# Patient Record
(demographics unavailable — no encounter records)

---

## 2021-07-26 NOTE — US
Limited abdominal ultrasound: Multiple real-time images of the upper 

right abdomen were obtained.

 

Comparison: No prior abdominal imaging is available.

 

Liver shows no focal abnormality.  Gallbladder contains no shadowing 

gallstones, gallbladder wall thickening or biliary duct dilatation.  

Pancreas is obscured due to bowel gas.  Right kidney is not 

visualized.  Main portal vein shows normal hepatopedal flow.

 

Impression:

1.  Midline structures are nonvisualized due to bowel gas.

2.  Other portions of the right upper quadrant abdominal ultrasound 

are unremarkable.

 

Diagnostic code #2

## 2021-07-26 NOTE — US
Limited obstetrical ultrasound: Multiple real-time images were 

obtained transabdominally.

 

Comparison: Prior obstetrical imaging at 07/15/21.

 

Dates:

Working KEVIN: 11/19/21, gestational age 22 weeks 3 days

Current ultrasound: KEVIN 12/03/21, gestational age 21 weeks 3 days

 

Fetal presentation: Transverse

Placenta: Anterior with slight placenta previa with inferior edge 

being within 2.2 cm from the internal cervical os

Amniotic fluid: AWAIS 10.7 cm

 

Measurements:

BPD: 4.77 cm - 20 weeks 3 days

Head circumference: 18.57 cm - 20 weeks 6 days

Abdominal circumference: 16.96 cm - 22 weeks 0 days

Femur length: 3.92 cm - 22 weeks 4 days

Estimated fetal weight: 470 g (1 lb. 1 oz.), estimated fetal weight is

 in the 45th percentile

Heart rate: 159 bpm

 

Growth curves: Various growth parameters are between the -2 and mean 

standard deviation lines.  Growth is appropriate from prior exams.

 

Impression:

1.  Single intrauterine fetus currently transverse in position.  Dates

 as noted above.

2.  Slight low lying placenta.

3.  Fetal growth is appropriate.  No acute abnormality is identified.

 

Diagnostic code #2

## 2021-07-27 NOTE — PCM.PN
- General Info


Date of Service: 07/27/21


Functional Status: Reports: Pain Controlled





- Review of Systems


General: Reports: No Symptoms


HEENT: Reports: No Symptoms


Pulmonary: Reports: No Symptoms


Cardiovascular: Reports: No Symptoms


Gastrointestinal: Reports: No Symptoms


Genitourinary: Reports: No Symptoms


Musculoskeletal: Reports: No Symptoms


Skin: Reports: No Symptoms


Neurological: Reports: No Symptoms


Psychiatric: Reports: No Symptoms





- Patient Data


Vitals - Most Recent: 


                                Last Vital Signs











Temp  37.1 C   07/27/21 06:20


 


Pulse  71   07/27/21 06:20


 


Resp  14   07/27/21 06:20


 


BP  111/79   07/27/21 06:20


 


Pulse Ox  96   07/27/21 06:20











Weight - Most Recent: 57.606 kg


I&O - Last 24 Hours: 


                                 Intake & Output











 07/26/21 07/27/21 07/27/21





 22:59 06:59 14:59


 


Intake Total 50  


 


Output Total 200  


 


Balance -150  











Med Orders - Current: 


                               Current Medications





Hydroxyzine HCl (Hydroxyzine Hcl 25 Mg/Ml Sdv)  50 mg IM Q6H PRN


   PRN Reason: Nausea


   Last Admin: 07/26/21 07:51 Dose:  50 mg


   Documented by: 


Dextrose/Lactated Ringer's (Dextrose 5%-Lactated Ringers)  1,000 mls @ 150 

mls/hr IV ASDIRECTED [facility]


   Last Admin: 07/26/21 21:26 Dose:  150 mls/hr


   Documented by: 


Promethazine HCl 25 mg/ Sodium (Chloride)  51 mls @ 100 mls/hr IV Q4H PRN


   PRN Reason: Nausea


   Last Admin: 07/27/21 08:57 Dose:  100 mls/hr


   Documented by: 


Ondansetron HCl (Ondansetron 4 Mg/2 Ml Sdv)  4 mg IVPUSH Q4H PRN


   PRN Reason: Nausea


   Last Admin: 07/26/21 04:33 Dose:  4 mg


   Documented by: 


Promethazine HCl (Promethazine 25 Mg Tab)  25 mg PO Q4H PRN


   PRN Reason: Nausea/Vomiting


   Last Admin: 07/27/21 06:55 Dose:  25 mg


   Documented by: 





Discontinued Medications





Al Hydroxide/Mg Hydroxide 30 (ml/ Lidocaine HCl 15 ml)  0 ml PO ONETIME ONE


   Stop: 07/26/21 05:44


   Last Admin: 07/26/21 05:58 Dose:  45 ml


   Documented by: 


Sodium Chloride (Normal Saline)  1,000 mls @ 999 mls/hr IV ONETIME ONE


   Stop: 07/26/21 05:16


   Last Admin: 07/26/21 04:28 Dose:  999 mls/hr


   Documented by: 


Promethazine HCl 25 mg/ Sodium (Chloride)  51 mls @ 100 mls/hr IV ONETIME ONE


   Stop: 07/26/21 11:15


   Last Admin: 07/26/21 10:46 Dose:  100 mls/hr


   Documented by: 











- Exam


General: Alert, Oriented


HEENT: Pupils Equal, Pupils Reactive, EOMI, Mucous Membr. Moist/Pink


Neck: Supple


Lungs: Clear to Auscultation, Normal Respiratory Effort


Cardiovascular: Regular Rate, Regular Rhythm


GI/Abdominal Exam: Normal Bowel Sounds, Soft, Non-Tender, No Organomegaly, No 

Distention, No Abnormal Bruit, No Mass, Pelvis Stable


 (Female) Exam: Normal External Exam


Extremities: Normal Inspection, Normal Range of Motion, Non-Tender, No Pedal 

Edema, Normal Capillary Refill


Skin: Warm, Dry, Intact


Wound/Incisions: Healing Well


Neurological: No New Focal Deficit





- Patient Data


Result Diagrams: 


                                 07/26/21 04:25





                                 07/26/21 04:25





Sepsis Event Note





- Evaluation


Sepsis Screening Result: No Definite Risk





- Focused Exam


Vital Signs: 


                                   Vital Signs











  Temp Pulse Resp BP Pulse Ox


 


 07/27/21 06:20  37.1 C  71  14  111/79  96


 


 07/27/21 01:48  37.2 C  71  14  129/80  93 L














- Problem List Review


Problem List Initiated/Reviewed/Updated: Yes





- My Orders


Last 24 Hours: 


My Active Orders





07/27/21 09:00


CORONAVIRUS COVID-19 LUL [MOLEC] Stat 














- Assessment


Assessment:: 





27 year old G1 here with nausea and intractable vomiting. Had been doing better 

over night. Now tried apple sauce and ice chips and had significant emesis. 





- Plan


Plan:: 





Nausea and emesis. continue IV phenergan. Labs pending. 


Can try rectal phenergan if keeps down food and fluids with IV.

## 2021-07-28 NOTE — CR
Abdomen: Supine and upright views of the abdomen were obtained.

 

Comparison: No prior abdominal x-rays available.

 

Diffuse gaseous dilatation is seen within the colon.  Lack of gas is 

noted within the descending colon and rectum.  Air-fluid levels are 

noted within the colon.

 

Soft tissue fullness is seen within the left side of the pelvis and 

extending slightly superior.

 

No free air is seen.  Bony structures are unremarkable.

 

Impression:

1.  Significantly gas dilated colon with air-fluid levels.  Lack of 

gas within the descending colon and rectum are seen.  Soft tissue 

prominence is seen within the left side of the pelvis extending 

superiorly.  Please correlate if patient has any symptoms to suggest 

ovarian torsion or other obstructing soft tissue abnormality.

2.  No free air is seen at this time.

 

Diagnostic code #5

## 2021-07-28 NOTE — PCM.PRNOTE
- Free Text/Narrative


Note: 





Date: 7/28/2021


Operation: laparotomy, left colectomy with end colostomy


Surgeon: John Blanco MD


Assisting: Jonnie Watts MD


Indication: colonic obstruction





DVT Ppx: SCD


Antibiotic: Ancef 2 g IV pre-op


EBL: 100 cc





Findings: 28 yo woman, 22 weeks pregnant, with evidence of colonic obstruction. 

Very distended but viable colon with near-completely obstructing circumferential

tumor near the junction of the descending and sigmoid colon. No mesenteric 

lymphadenopathy or other evidence of intra-abdominal metastasis. Distal 

transverse colon brought out as end colostomy. 





Detailed Report:





The patient was taken to the operating room and placed supine on the table. Time

out was performed and general endotracheal anesthesia initiated. The abdomen was

prepped and draped in usual sterile fashion after placement of a trinidad catheter.

10 cc 0.5% marcaine with epinpehrine was injected along planned midline laparot

estelita incision. A 20 cm midline laparotomy was performed using the scalpel. 

Monopolar energy was used to dissect through subcutaneous fat to the linea alba.

Fascia was grasped on either side of midline and elevated. The fascia and 

peritoneum were divided sharply and the abdominal cavity was entered safely. The

incision was then completely opened and abdominal contents inspected. There was 

some serosanguinous ascites and massive dilation of the cecum, with less 

pronounced dilation of the transverse colon. The cecum was brought toward 

midline. On handling, there were two sizeable serosal tears but no evidence of 

perforation or bowel ischemia. A small colotomy was made to decompress the bowel

of some air. Afterwards, the colotomy was closed in layers with transverse 

interrupted vicryl sutures. The colon was inspected carefully. There was no 

evidence of volvulus or significant small bowel dilation. There were no 

intraabdominal adhesions. On palpation of the descending colon near the start of

the sigmoid colon was a palpable stricture with surrounding tissue induration 

that was initially thought to just be palpable intraluminal stool but on closer 

inspection felt markedly abnormal and seemed to be the point of obstruction. An 

oncologic resection was performed. Colon was divided with a linear stapler about

10 cm distal to the mass. The descending colon was reflected medially and the la

teral attachments divided with monopolar energy along the white line of Toldt. 

As the colon was reflected medially, blunt dissection with a sponge stick was 

used to separate meso colon from the underlying retroperitoneum .The splenic 

flexure and distal tranverse colon were brought toward midline. The IMV was 

identified from the medial aspect and divided near its root with the Ligasure 

Impact. The colon was divided proximally about 20 cm from the mass at the distal

transverse colon. Remaining mesentery was divided with the Ligasure. The 

specimen was opened on the back table and there was a circumferential near 

obstructing tumor. The specimen was sent for analysis. Next, cecal serosal tears

were repaired with running 3-0 vicryl suture. There were two tears, each about 7

cm in length. Next, the left abdomen was prepared for colostomy creation. A 

circular incision was made through skin overlying the rectus muscle, just 

superior to the umbilicus. Dissection was taken down to the anterior rectus 

sheath. This was incised in cruciate fashion. muscle fibers were splayed to 

expose the posterior sheath. this was incised and dilated manually to three 

fingerbreadths. the end of the tranverse colon was brought up through the 

abdominal wall. Next, the midline was closed. Running 0 PDS suture was used to 

close fascia. Skin was closed with staples. A dressing was applied. Next, the 

colostomy was matured. The staple line was removed and colonic contents 

suctioned. Digital exam confirmed patency through the abdominal wall. Several 

interrupted 3-0 vicryl sutures were used to secure full thickness bites of colon

the the surrounding skin circumferentially. The mesocolon lay at the medial 

aspect. An ostomy appliance was placed. Additional local anesthetic was placed 

around the ostomy site intradermally. The midline wound was then redressed. The 

patient tolerated the procedure well.

## 2021-07-28 NOTE — PCM.PN
- General Info


Date of Service: 07/28/21


Functional Status: Reports: New Symptoms (significant bloating and distention, 

reports no BM since friday and no gas since prior to that).  Denies: Tolerating 

Diet





- Review of Systems


General: Reports: No Symptoms


HEENT: Reports: No Symptoms


Pulmonary: Reports: No Symptoms


Cardiovascular: Reports: No Symptoms


Gastrointestinal: Reports: No Symptoms


Genitourinary: Reports: No Symptoms


Musculoskeletal: Reports: No Symptoms


Skin: Reports: No Symptoms


Neurological: Reports: No Symptoms


Psychiatric: Reports: No Symptoms





- Patient Data


Vitals - Most Recent: 


                                Last Vital Signs











Temp  37.1 C   07/28/21 00:18


 


Pulse  82   07/28/21 00:18


 


Resp  14   07/28/21 00:18


 


BP  137/82   07/28/21 00:18


 


Pulse Ox  96   07/28/21 00:18











Weight - Most Recent: 57.606 kg


I&O - Last 24 Hours: 


                                 Intake & Output











 07/27/21 07/27/21 07/28/21





 14:59 22:59 06:59


 


Intake Total 120 800 


 


Output Total   100


 


Balance 120 800 -100











Lab Results Last 24 Hours: 


                         Laboratory Results - last 24 hr











  07/27/21 07/27/21 07/27/21 Range/Units





  09:16 09:55 09:55 


 


WBC   8.87   (3.98-10.04)  K/mm3


 


RBC   4.09   (3.98-5.22)  M/mm3


 


Hgb   11.6   (11.2-15.7)  gm/dl


 


Hct   34.8   (34.1-44.9)  %


 


MCV   85.1   (79.4-94.8)  fl


 


MCH   28.4   (25.6-32.2)  pg


 


MCHC   33.3   (32.2-35.5)  g/dl


 


RDW Std Deviation   47.3 H   (36.4-46.3)  fL


 


Plt Count   218   (182-369)  K/mm3


 


MPV   10.3   (9.4-12.3)  fl


 


Neut % (Auto)   88.6 H   (34.0-71.1)  %


 


Lymph % (Auto)   4.8 L   (19.3-51.7)  %


 


Mono % (Auto)   6.3   (4.7-12.5)  %


 


Eos % (Auto)   0.1 L   (0.7-5.8)  


 


Baso % (Auto)   0.0 L   (0.1-1.2)  %


 


Neut # (Auto)   7.85 H   (1.56-6.13)  K/mm3


 


Lymph # (Auto)   0.43 L   (1.18-3.74)  K/mm3


 


Mono # (Auto)   0.56 H   (0.24-0.36)  K/mm3


 


Eos # (Auto)   0.01 L   (0.04-0.36)  K/mm3


 


Baso # (Auto)   0.00 L   (0.01-0.08)  K/mm3


 


Sodium    143  (136-145)  mEq/L


 


Potassium    2.9 L  (3.5-5.1)  mEq/L


 


Chloride    108 H  ()  mEq/L


 


Carbon Dioxide    25  (21-32)  mEq/L


 


Anion Gap    12.9  (5-15)  


 


BUN    6 L  (7-18)  mg/dL


 


Creatinine    0.7  (0.55-1.02)  mg/dL


 


Est Cr Clr Drug Dosing    99.86  mL/min


 


Estimated GFR (MDRD)    > 60  (>60)  mL/min


 


BUN/Creatinine Ratio    8.6 L  (14-18)  


 


Glucose    115 H  (70-99)  mg/dL


 


Calcium    8.0 L  (8.5-10.1)  mg/dL


 


SARS-CoV-2 RNA (LUL)  Negative    (NEGATIVE)  











Med Orders - Current: 


                               Current Medications





Acetaminophen (Acetaminophen 650 Mg Supp)  650 mg RECTAL Q6H PRN


   PRN Reason: Abdominal Pain


Glycerin (Glycerin Adult 2 Gm Supp)  1 supp RECTAL ONETIME PRN


   PRN Reason: Constipation


Hydroxyzine HCl (Hydroxyzine Hcl 25 Mg/Ml Sdv)  50 mg IM Q6H PRN


   PRN Reason: Nausea


   Last Admin: 07/26/21 07:51 Dose:  50 mg


   Documented by: 


Dextrose/Lactated Ringer's (Dextrose 5%-Lactated Ringers)  1,000 mls @ 150 

mls/hr IV ASDIRECTED [facility]


   Last Admin: 07/27/21 16:44 Dose:  150 mls/hr


   Documented by: 


Promethazine HCl 25 mg/ Sodium (Chloride)  51 mls @ 100 mls/hr IV Q4H PRN


   PRN Reason: Nausea


   Last Admin: 07/27/21 08:57 Dose:  100 mls/hr


   Documented by: 


Potassium Chloride 40 meq/ (Dextrose/Lactated Ringer's)  1,020 mls @ 150 mls/hr 

IV Q7H [facility]


   Stop: 07/28/21 23:02


   Last Admin: 07/27/21 19:39 Dose:  150 mls/hr


   Documented by: 


Levothyroxine Sodium (Levothyroxine 50 Mcg Tab)  50 mcg PO ACBREAKFAST [facility]


Ondansetron HCl (Ondansetron 4 Mg/2 Ml Sdv)  4 mg IVPUSH Q4H PRN


   PRN Reason: Nausea


   Last Admin: 07/26/21 04:33 Dose:  4 mg


   Documented by: 


Promethazine HCl (Promethazine 25 Mg Tab)  25 mg PO Q4H PRN


   PRN Reason: Nausea/Vomiting


   Last Admin: 07/27/21 13:20 Dose:  25 mg


   Documented by: 


Simethicone (Simethicone 80 Mg Tab.Chew)  80 mg PO Q4H PRN


   PRN Reason: Gas


   Last Admin: 07/27/21 22:14 Dose:  80 mg


   Documented by: 





Discontinued Medications





Acetaminophen (Acetaminophen 650 Mg Supp)  975 mg RECTAL NOW ONE


   Stop: 07/27/21 16:13


   Last Admin: 07/27/21 16:48 Dose:  Not Given


   Documented by: 


Acetaminophen (Acetaminophen 650 Mg Supp) Confirm Administered Dose 650 mg 

.ROUTE .STK-MED ONE


   Stop: 07/27/21 17:44


   Last Admin: 07/27/21 18:00 Dose:  650 mg


   Documented by: 


Al Hydroxide/Mg Hydroxide 30 (ml/ Lidocaine HCl 15 ml)  0 ml PO ONETIME ONE


   Stop: 07/26/21 05:44


   Last Admin: 07/26/21 05:58 Dose:  45 ml


   Documented by: 


Glycerin (Glycerin Adult 2 Gm Supp)  1 supp RECTAL ONETIME ONE


   Stop: 07/27/21 20:48


   Last Admin: 07/27/21 21:33 Dose:  Not Given


   Documented by: 


Glycerin (Glycerin Pediatric 1.2 Gm Supp)  3 gm RECTAL ONETIME ONE


   Stop: 07/27/21 21:14


   Last Admin: 07/27/21 21:28 Dose:  3 gm


   Documented by: 


Sodium Chloride (Normal Saline)  1,000 mls @ 999 mls/hr IV ONETIME ONE


   Stop: 07/26/21 05:16


   Last Admin: 07/26/21 04:28 Dose:  999 mls/hr


   Documented by: 


Promethazine HCl 25 mg/ Sodium (Chloride)  51 mls @ 100 mls/hr IV ONETIME ONE


   Stop: 07/26/21 11:15


   Last Admin: 07/26/21 10:46 Dose:  100 mls/hr


   Documented by: 


Potassium Chloride 40 meq/ (Dextrose/Lactated Ringer's)  1,020 mls @ 150 mls/hr 

IV ASDIRECTED [facility]


   Stop: 07/31/21 01:02


Levothyroxine Sodium (Levothyroxine 50 Mcg Tab)  50 mcg PO ONETIME ONE


   Stop: 07/27/21 10:46


   Last Admin: 07/27/21 13:11 Dose:  50 mcg


   Documented by: 











- Exam


General: Alert, Oriented


HEENT: Pupils Equal, Pupils Reactive, EOMI, Mucous Membr. Moist/Pink


Neck: Supple


Lungs: Clear to Auscultation, Normal Respiratory Effort


Cardiovascular: Regular Rate, Regular Rhythm


GI/Abdominal Exam: Normal Bowel Sounds, Soft, Non-Tender, No Organomegaly, No 

Distention, No Abnormal Bruit, No Mass, Pelvis Stable


 (Female) Exam: Normal External Exam, Normal Speculum Exam, Normal Bimanual 

Exam


Back Exam: Normal Inspection, Full Range of Motion


Extremities: Normal Inspection, Normal Range of Motion, Non-Tender, No Pedal 

Edema, Normal Capillary Refill


Skin: Warm, Dry, Intact


Wound/Incisions: Healing Well


Neurological: No New Focal Deficit


Psy/Mental Status: Alert, Normal Affect, Normal Mood





- Patient Data


Lab Results Last 24 hrs: 


                         Laboratory Results - last 24 hr











  07/27/21 07/27/21 07/27/21 Range/Units





  09:16 09:55 09:55 


 


WBC   8.87   (3.98-10.04)  K/mm3


 


RBC   4.09   (3.98-5.22)  M/mm3


 


Hgb   11.6   (11.2-15.7)  gm/dl


 


Hct   34.8   (34.1-44.9)  %


 


MCV   85.1   (79.4-94.8)  fl


 


MCH   28.4   (25.6-32.2)  pg


 


MCHC   33.3   (32.2-35.5)  g/dl


 


RDW Std Deviation   47.3 H   (36.4-46.3)  fL


 


Plt Count   218   (182-369)  K/mm3


 


MPV   10.3   (9.4-12.3)  fl


 


Neut % (Auto)   88.6 H   (34.0-71.1)  %


 


Lymph % (Auto)   4.8 L   (19.3-51.7)  %


 


Mono % (Auto)   6.3   (4.7-12.5)  %


 


Eos % (Auto)   0.1 L   (0.7-5.8)  


 


Baso % (Auto)   0.0 L   (0.1-1.2)  %


 


Neut # (Auto)   7.85 H   (1.56-6.13)  K/mm3


 


Lymph # (Auto)   0.43 L   (1.18-3.74)  K/mm3


 


Mono # (Auto)   0.56 H   (0.24-0.36)  K/mm3


 


Eos # (Auto)   0.01 L   (0.04-0.36)  K/mm3


 


Baso # (Auto)   0.00 L   (0.01-0.08)  K/mm3


 


Sodium    143  (136-145)  mEq/L


 


Potassium    2.9 L  (3.5-5.1)  mEq/L


 


Chloride    108 H  ()  mEq/L


 


Carbon Dioxide    25  (21-32)  mEq/L


 


Anion Gap    12.9  (5-15)  


 


BUN    6 L  (7-18)  mg/dL


 


Creatinine    0.7  (0.55-1.02)  mg/dL


 


Est Cr Clr Drug Dosing    99.86  mL/min


 


Estimated GFR (MDRD)    > 60  (>60)  mL/min


 


BUN/Creatinine Ratio    8.6 L  (14-18)  


 


Glucose    115 H  (70-99)  mg/dL


 


Calcium    8.0 L  (8.5-10.1)  mg/dL


 


SARS-CoV-2 RNA (LUL)  Negative    (NEGATIVE)  











Result Diagrams: 


                                 07/27/21 09:55





                                 07/27/21 09:55





Sepsis Event Note





- Evaluation


Sepsis Screening Result: No Definite Risk





- Focused Exam


Vital Signs: 


                                   Vital Signs











  Temp Temp Pulse Resp BP Pulse Ox


 


 07/28/21 00:18  37.1 C   82  14  137/82  96


 


 07/27/21 19:33  37.8 C   73  14  120/79  96


 


 07/27/21 18:30  37.8 C     


 


 07/27/21 17:30  37.3 C   77  16  132/94 H  97


 


 07/27/21 16:49   37.6 C    


 


 07/27/21 16:48  37.6 C     


 


 07/27/21 15:52  38.1 C   90  18  141/97 H  100














- Problem List Review


Problem List Initiated/Reviewed/Updated: Yes





- My Orders


Last 24 Hours: 


My Active Orders





07/27/21 16:56


Admission Status [Patient Status] [ADT] Routine 





07/27/21 17:53


Acetaminophen [Tylenol]   650 mg RECTAL Q6H PRN 





07/27/21 19:15


Potassium Chloride 40 meq   Dextrose 5%-Lactated Ringers 1,000 ml IV Q7H 





07/27/21 20:48


Simethicone   80 mg PO Q4H PRN 





07/27/21 22:06


Glycerin [Adult Glycerin]   1 supp RECTAL ONETIME PRN 





07/28/21 05:00


COMPREHENSIVE METABOLIC PN,CMP [CHEM] Routine 





07/28/21 06:00


Levothyroxine [Synthroid]   50 mcg PO ACBREAKFAST 














- Assessment


Assessment:: 





27 year old G1 here with nausea and intractable vomiting. Had been doing better 

through evening but now feeling significant bloating and discomfort. Reports 

last BM Friday (5-6 days ago) and no gas from before that. 





Trial of mineral enema and if no success/improvement discussed general surgery 

consult and either flat plat abdominal film or CT abdomen depending on surgical 

assessment.

## 2021-07-28 NOTE — PCM.CONS
H&P History of Present Illness





- General


Date of Service: 21


Admit Problem/Dx: 


                           Admission Diagnosis/Problem





Admission Diagnosis/Problem      Pregnancy








Source of Information: Patient


History Limitations: Reports: No Limitations





- History of Present Illness


Initial Comments - Free Text/Narative: 





Ms. Corea is a 28 yo woman who is 22 weeks pregnant () who was admitted 48 

hours ago due to abdominal bloating, pain, nausea and vomiting. She was feeling 

well with no pregnancy issues up to this point. She has hypothyroidism for which

she takes synthroid but is otherwise healthy appearing and has had no prior 

abdominal operations. 


She has been unable to keep anything down for a few days now, vomiting 

frequently. She cannot recall passing flatus for the past few days. She was 

having normal bowel movements up to the time she started feeling ill. She was 

given a suppository last night and reports having a soft bowel movement 

afterwards. 


On exam, she appears mildly uncomfortable. Vitals are in normal range. Abdomen 

is distended, tympanitic, soft, with mild diffuse tenderness. A gestational and 

abdominal ultrasound were obtained with no pathologic findings noted- the 

gallbladder appeared normal with no stones. Her CBC is within normal range 

except for mild, stable anemia around 11.5 g/dL. CMP is significant only for 

hypokalemia around 3 mEq/dL, and U/A shows ketonuria. 





- Related Data


Allergies/Adverse Reactions: 


                                    Allergies











Allergy/AdvReac Type Severity Reaction Status Date / Time


 


No Known Allergies Allergy   Verified 21 03:54











Home Medications: 


                                    Home Meds





Levothyroxine Sodium [Levothyroxine] 50 mcg PO DAILY 21 [History]


Prenatal No122/Iron/Folic Acid [Prenatal Multi Tablet] 1 each PO DAILY 21 

[History]











Past Medical History


HEENT History: Reports: None


Cardiovascular History: Reports: None


Respiratory History: Reports: None


Gastrointestinal History: Reports: None


Genitourinary History: Reports: None


OB/GYN History: Reports: Pregnancy


Musculoskeletal History: Reports: None


Neurological History: Reports: None


Psychiatric History: Reports: None


Endocrine/Metabolic History: Reports: Hypothyroidism


Hematologic History: Reports: None


Immunologic History: Reports: None


Oncologic (Cancer) History: Reports: None


Dermatologic History: Reports: None





- Past Surgical History


Neurological Surgical History: Reports: None


Musculoskeletal Surgical History: Reports: None





Social & Family History





- Tobacco Use


Tobacco Use Status *Q: Never Tobacco User


Second Hand Smoke Exposure: No





- Caffeine Use


Caffeine Use: Reports: Energy Drinks, Soda





- Recreational Drug Use


Recreational Drug Use: No





H&P Review of Systems





- Review of Systems:


Review Of Systems: See Below


General: Reports: Malaise


HEENT: Reports: No Symptoms


Pulmonary: Reports: No Symptoms


Cardiovascular: Reports: No Symptoms


Gastrointestinal: Reports: Abdominal Pain, Nausea, Vomiting


Genitourinary: Reports: No Symptoms


Musculoskeletal: Reports: No Symptoms


Skin: Reports: No Symptoms


Psychiatric: Reports: No Symptoms


Neurological: Reports: No Symptoms


Hematologic/Lymphatic: Reports: No Symptoms


Immunologic: Reports: No Symptoms





Exam





- Exam


Exam: See Below





- Vital Signs


Vital Signs: 


                                Last Vital Signs











Temp  36.8 C   21 03:59


 


Pulse  82   21 03:59


 


Resp  16   21 03:59


 


BP  125/94 H  21 03:59


 


Pulse Ox  97   21 03:59











Weight: 57.606 kg





- Exam


General: Alert, Oriented, Cooperative


HEENT: Conjunctiva Clear


Neck: Supple, Trachea Midline


Lungs: Clear to Auscultation, Normal Respiratory Effort


Cardiovascular: Regular Rate, Regular Rhythm


GI/Abdominal Exam: Normal Bowel Sounds, Soft, Distended, Tender, Other (gravid 

uterus)


 (Female) Exam: Deferred


Rectal (Female) Exam: Deferred


Extremities: Normal Inspection


Skin: Warm, Dry


Neuro Extensive - Mental Status: Alert, Oriented x3, Normal Mood/Affect


Psychiatric: Alert





- Patient Data


Lab Results Last 24 hrs: 


                         Laboratory Results - last 24 hr











  21 Range/Units





  09:16 09:55 09:55 


 


WBC   8.87   (3.98-10.04)  K/mm3


 


RBC   4.09   (3.98-5.22)  M/mm3


 


Hgb   11.6   (11.2-15.7)  gm/dl


 


Hct   34.8   (34.1-44.9)  %


 


MCV   85.1   (79.4-94.8)  fl


 


MCH   28.4   (25.6-32.2)  pg


 


MCHC   33.3   (32.2-35.5)  g/dl


 


RDW Std Deviation   47.3 H   (36.4-46.3)  fL


 


Plt Count   218   (182-369)  K/mm3


 


MPV   10.3   (9.4-12.3)  fl


 


Neut % (Auto)   88.6 H   (34.0-71.1)  %


 


Lymph % (Auto)   4.8 L   (19.3-51.7)  %


 


Mono % (Auto)   6.3   (4.7-12.5)  %


 


Eos % (Auto)   0.1 L   (0.7-5.8)  


 


Baso % (Auto)   0.0 L   (0.1-1.2)  %


 


Neut # (Auto)   7.85 H   (1.56-6.13)  K/mm3


 


Lymph # (Auto)   0.43 L   (1.18-3.74)  K/mm3


 


Mono # (Auto)   0.56 H   (0.24-0.36)  K/mm3


 


Eos # (Auto)   0.01 L   (0.04-0.36)  K/mm3


 


Baso # (Auto)   0.00 L   (0.01-0.08)  K/mm3


 


Manual Slide Review     


 


Sodium    143  (136-145)  mEq/L


 


Potassium    2.9 L  (3.5-5.1)  mEq/L


 


Chloride    108 H  ()  mEq/L


 


Carbon Dioxide    25  (21-32)  mEq/L


 


Anion Gap    12.9  (5-15)  


 


BUN    6 L  (7-18)  mg/dL


 


Creatinine    0.7  (0.55-1.02)  mg/dL


 


Est Cr Clr Drug Dosing    99.86  mL/min


 


Estimated GFR (MDRD)    > 60  (>60)  mL/min


 


BUN/Creatinine Ratio    8.6 L  (14-18)  


 


Glucose    115 H  (70-99)  mg/dL


 


Calcium    8.0 L  (8.5-10.1)  mg/dL


 


Total Bilirubin     (0.2-1.0)  mg/dL


 


AST     (15-37)  U/L


 


ALT     (14-59)  U/L


 


Alkaline Phosphatase     ()  U/L


 


Total Protein     (6.4-8.2)  g/dl


 


Albumin     (3.4-5.0)  g/dl


 


Globulin     gm/dL


 


Albumin/Globulin Ratio     (1-2)  


 


SARS-CoV-2 RNA (LUL)  Negative    (NEGATIVE)  














  21 Range/Units





  06:20 06:20 


 


WBC   8.33  (3.98-10.04)  K/mm3


 


RBC   3.87 L  (3.98-5.22)  M/mm3


 


Hgb   11.1 L  (11.2-15.7)  gm/dl


 


Hct   33.2 L  (34.1-44.9)  %


 


MCV   85.8  (79.4-94.8)  fl


 


MCH   28.7  (25.6-32.2)  pg


 


MCHC   33.4  (32.2-35.5)  g/dl


 


RDW Std Deviation   47.5 H  (36.4-46.3)  fL


 


Plt Count   201  (182-369)  K/mm3


 


MPV   10.0  (9.4-12.3)  fl


 


Neut % (Auto)   89.6 H  (34.0-71.1)  %


 


Lymph % (Auto)   4.4 L  (19.3-51.7)  %


 


Mono % (Auto)   5.5  (4.7-12.5)  %


 


Eos % (Auto)   0 L  (0.7-5.8)  


 


Baso % (Auto)   0.1  (0.1-1.2)  %


 


Neut # (Auto)   7.46 H  (1.56-6.13)  K/mm3


 


Lymph # (Auto)   0.37 L  (1.18-3.74)  K/mm3


 


Mono # (Auto)   0.46 H  (0.24-0.36)  K/mm3


 


Eos # (Auto)   0.00 L  (0.04-0.36)  K/mm3


 


Baso # (Auto)   0.01  (0.01-0.08)  K/mm3


 


Manual Slide Review   Abnormal smear  


 


Sodium  140   (136-145)  mEq/L


 


Potassium  3.1 L   (3.5-5.1)  mEq/L


 


Chloride  106   ()  mEq/L


 


Carbon Dioxide  23   (21-32)  mEq/L


 


Anion Gap  14.1   (5-15)  


 


BUN  5 L   (7-18)  mg/dL


 


Creatinine  0.6   (0.55-1.02)  mg/dL


 


Est Cr Clr Drug Dosing  116.50   mL/min


 


Estimated GFR (MDRD)  > 60   (>60)  mL/min


 


BUN/Creatinine Ratio  8.3 L   (14-18)  


 


Glucose  138 H   (70-99)  mg/dL


 


Calcium  7.9 L   (8.5-10.1)  mg/dL


 


Total Bilirubin  0.3   (0.2-1.0)  mg/dL


 


AST  27   (15-37)  U/L


 


ALT  34   (14-59)  U/L


 


Alkaline Phosphatase  41 L   ()  U/L


 


Total Protein  6.2 L   (6.4-8.2)  g/dl


 


Albumin  2.7 L   (3.4-5.0)  g/dl


 


Globulin  3.5   gm/dL


 


Albumin/Globulin Ratio  0.8 L   (1-2)  


 


SARS-CoV-2 RNA (LUL)    (NEGATIVE)  











Result Diagrams: 


                                 21 06:20





                                 21 06:20





Sepsis Event Note





- Evaluation


Sepsis Screening Result: No Definite Risk





- Focused Exam


Vital Signs: 


                                   Vital Signs











  Temp Pulse Resp BP Pulse Ox


 


 21 03:59  36.8 C  82  16  125/94 H  97


 


 21 00:18  37.1 C  82  14  137/82  96














Consult PN Assessment/Plan


Procedures: 


Procedures





BLOOD TYPING SEROLOGIC ABO (21)


BLOOD TYPING SEROLOGIC RH(D) (21)


CHYLMD TRACH DNA AMP PROBE (21)


COMPLETE CBC W/AUTO DIFF WBC (21)


HEPATITIS B SURFACE AG IA (21)


HEPATITIS C AB TEST (21)


HIV-1 AG W/HIV-1 & -2 AB AG IA (21)


N.GONORRHOEAE DNA AMP PROB (21)


OB US >/= 14 WKS SNGL FETUS (07/15/21)


RBC ANTIBODY SCREEN (21)


RUBELLA ANTIBODY (21)


SYPHILIS TEST NON-TREP QUAL (21)


URINALYSIS AUTO W/O SCOPE (21)


URINE CULTURE/COLONY COUNT (21)








Problem List Initiated/Reviewed/Updated: Yes


My Orders Last 24 Hours: 


My Active Orders





21 08:47


Abdomen 2V AP Flat Upright [CR] Urgent 





21 08:49


TSH [CHEM] Routine 





21 09:00


Potassium Chloride [KCl in Water 10 MEQ/100 ML] 10 meq   Premix Bag 1 bag IV Q1H













Plan: 





Nausea and vomiting for past 24 hours, with abdominal cramping and distention. 


Differential includes constipation, gastroenteritis, hyperemesis gravidarum, and

less likely ileus or de rosette small bowel obstruction.


No evidence of acute abdomen or need for emergency surgery.





Recommendations:


-2 view plain films of abdomen to assess for ileus, constipation


-check TSH


-supplement 40 mEq K+ IV


 





Requesting Provider: Deepa


Date Consult Requested: 21


Reason for Consult: abdominal pain, vomiting


Patient History Reviewed: Yes


Admission H&P Reviewed: Yes (no H&P for this admission in [facility])


Notified Requestor: Yes


Time Spent (in minutes): 60

## 2021-07-28 NOTE — PCM.POSTAN
POST ANESTHESIA ASSESSMENT





- MENTAL STATUS


Mental Status: Alert, Oriented





- VITAL SIGNS


Vital Signs: 


                                Last Vital Signs











Temp  99.7 F   07/28/21 10:30


 


Pulse  73   07/28/21 10:30


 


Resp  15   07/28/21 10:30


 


BP  106/84   07/28/21 10:30


 


Pulse Ox  97   07/28/21 10:30








1438


133/90


100%


99


11


98.2





- RESPIRATORY


Respiratory Status: Respiratory Rate WNL, Airway Patent, O2 Saturation Stable, 

Supplemental Oxygen





- CARDIOVASCULAR


CV Status: Pulse Rate WNL, Blood Pressure Stable





- GASTROINTESTINAL


GI Status: No Symptoms





- PAIN


Pain Score: 0





- POST OP HYDRATION


Hydration Status: Adequate & Stable

## 2021-07-28 NOTE — PCM.SN.[email or web address]
- Free Text/Narrative


Note: 





Patient feeling more poorly. Dizzy now too. Labs will be drawn now.


Plan general surgery consult at 7 am.


Likely CT scan but would await his opinion.

## 2021-07-28 NOTE — PCM.LDHP
L&D History of Present Illness





- General


Date of Service: 21


Admit Problem/Dx: 


                           Admission Diagnosis/Problem





Admission Diagnosis/Problem      Pregnancy








Source of Information: Patient


History Limitations: Reports: No Limitations





- History of Present Illness


Introduction:: 





Patient is admitted to observation during the course of last evening.  She has a

2-day history of nausea in pregnancy.  She has had an inconsistent response to 

antiemetics.  She has had the most relief from IV Phenergan.  Has not had a 

significant bout of food in the last 2 days.  Has been able to keep fluids down 

at times but throws it up at other times.  She has had mild hypokalemia which is

now being treated with IV fluids supplemented with potassium.  The course the 

last 12 to 24 hours patient's abdomen has become somewhat firm and mildly 

distended.  She is not had a bowel movement for at least 3 days by her history. 

Surgical consult was obtained.  Will obtain a flatplate of the abdomen.  Will 

treat as indicated by these results.  Continue with IV fluid hydration.





OB/GYN history:  1 para 0.  KEVIN 2021 is based upon an early 

ultrasound done on May 25, 2021 at 13-1/7 weeks gestational age.  This places 

her now approximately 22 weeks gestation.  Pregnancy has been unremarkable up to

this point.  She has hypothyroid and thyroid function studies done early in the 

pregnancy were unremarkable.  She has been taking her thyroid medications up 

until the last 2 days when she is not able to keep meds down.  She is reporting 

good fetal activity.  No significant contractions.  Denies any loss of vaginal 

fluid, bleeding etc.  Risk factors for the pregnancy include hypothyroidism and 

nausea.  Also distance from the hospital and that she lives in the [place], [place].





Laboratory testing in pregnancy: Blood is be positive with a negative antibody 

screen.  Hemoglobin at first  visit was 12.6 g/dL.  Platelets are 234,000. 

She is rubella immune.  RPR is nonreactive.  Urinalysis was negative.  B surface

antigen and HIV assays were both negative.  Chlamydia, gonorrhea, hepatitis C 

antibodies were negative.





Allergies: None





Medications:





1.  Levothyroxine 50 mcg/day





2.  Prenatal vitamins daily





Past medical history:





1.  Hypothyroidism





Past surgical history: Unremarkable





Family history: Mother is alive but has hypothyroidism.  Father is alive but 

suffers from atrial fibrillation.  1 brother alive and well.  Paternal 

grandmother alive but with heart disease and a pacemaker.  Maternal grandfather 

alive and well.  Paternal grandmother with hypothyroidism.  Paternal grandfather

alive and well.  No bleeding, clotting, pregnancy or anesthesia problems noted 

in the family.





Social history: Patient is single.  Her significant other is Kali Niño.  [place].  She is a school graduate.

 She does not use any significance alcohol, drugs or tobacco.





Review of systems: As per HPI.





Skin: Negative





Lungs: No infectious symptoms or shortness of breath





Cardiovascular: No chest pain or exercise intolerance





Breasts: No lumps, changes in size, pain, dimpling, discharge or axillary or 

supraclavicular concerns.





GI: Negative





: Per HPI.





Musculoskeletal: Negative





Neurological: Negative





In general the patient is well-developed, well-nourished, pleasant female of 

stated age in no acute distress.





Skin is warm dry without lesions.





HEENT, neck and back within normal limits.





Lungs are clear with good breath sounds in all lung fields.





Cardiovascular exam shows regular and rhythm without murmurs.





Abdomen upon admission to observation is mildly distended, firm, uncomfortable 

with palpation.  No acute abdomen signs are noted.





Genital exam is not performed as patient is having no problems referable to this

area.





Extremities and neurological exam are grossly within normal limits.





Pain Score: 6





- Related Data


Allergies/Adverse Reactions: 


                                    Allergies











Allergy/AdvReac Type Severity Reaction Status Date / Time


 


No Known Allergies Allergy   Verified 21 03:54











Home Medications: 


                                    Home Meds





Levothyroxine Sodium [Levothyroxine] 50 mcg PO DAILY 21 [History]


Prenatal No122/Iron/Folic Acid [Prenatal Multi Tablet] 1 each PO DAILY 21 

[History]











Past Medical History


HEENT History: Reports: None


Cardiovascular History: Reports: None


Respiratory History: Reports: None


Gastrointestinal History: Reports: None


Genitourinary History: Reports: None


OB/GYN History: Reports: Pregnancy


Musculoskeletal History: Reports: None


Neurological History: Reports: None


Psychiatric History: Reports: None


Endocrine/Metabolic History: Reports: Hypothyroidism


Hematologic History: Reports: None


Immunologic History: Reports: None


Oncologic (Cancer) History: Reports: None


Dermatologic History: Reports: None





- Past Surgical History


Neurological Surgical History: Reports: None


Musculoskeletal Surgical History: Reports: None





Social & Family History





- Tobacco Use


Tobacco Use Status *Q: Never Tobacco User


Second Hand Smoke Exposure: No





- Caffeine Use


Caffeine Use: Reports: Energy Drinks, Soda





- Recreational Drug Use


Recreational Drug Use: No





H&P Review of Systems





- Review of Systems:


Review Of Systems: See Below





L&D Exam





- Exam


Exam: See Below





- Vital Signs


Vital Signs: 


                                Last Vital Signs











Temp  36.8 C   21 03:59


 


Pulse  82   21 03:59


 


Resp  16   21 03:59


 


BP  125/94 H  21 03:59


 


Pulse Ox  97   21 03:59











Weight: 57.606 kg





- OB Specific


Contraction Frequency (min): 0


Contraction Intensity: absent





- Patient Data


Lab Results Last 24 hrs: 


                         Laboratory Results - last 24 hr











  21 Range/Units





  09:16 09:55 09:55 


 


WBC   8.87   (3.98-10.04)  K/mm3


 


RBC   4.09   (3.98-5.22)  M/mm3


 


Hgb   11.6   (11.2-15.7)  gm/dl


 


Hct   34.8   (34.1-44.9)  %


 


MCV   85.1   (79.4-94.8)  fl


 


MCH   28.4   (25.6-32.2)  pg


 


MCHC   33.3   (32.2-35.5)  g/dl


 


RDW Std Deviation   47.3 H   (36.4-46.3)  fL


 


Plt Count   218   (182-369)  K/mm3


 


MPV   10.3   (9.4-12.3)  fl


 


Neut % (Auto)   88.6 H   (34.0-71.1)  %


 


Lymph % (Auto)   4.8 L   (19.3-51.7)  %


 


Mono % (Auto)   6.3   (4.7-12.5)  %


 


Eos % (Auto)   0.1 L   (0.7-5.8)  


 


Baso % (Auto)   0.0 L   (0.1-1.2)  %


 


Neut # (Auto)   7.85 H   (1.56-6.13)  K/mm3


 


Lymph # (Auto)   0.43 L   (1.18-3.74)  K/mm3


 


Mono # (Auto)   0.56 H   (0.24-0.36)  K/mm3


 


Eos # (Auto)   0.01 L   (0.04-0.36)  K/mm3


 


Baso # (Auto)   0.00 L   (0.01-0.08)  K/mm3


 


Manual Slide Review     


 


Sodium    143  (136-145)  mEq/L


 


Potassium    2.9 L  (3.5-5.1)  mEq/L


 


Chloride    108 H  ()  mEq/L


 


Carbon Dioxide    25  (21-32)  mEq/L


 


Anion Gap    12.9  (5-15)  


 


BUN    6 L  (7-18)  mg/dL


 


Creatinine    0.7  (0.55-1.02)  mg/dL


 


Est Cr Clr Drug Dosing    99.86  mL/min


 


Estimated GFR (MDRD)    > 60  (>60)  mL/min


 


BUN/Creatinine Ratio    8.6 L  (14-18)  


 


Glucose    115 H  (70-99)  mg/dL


 


Calcium    8.0 L  (8.5-10.1)  mg/dL


 


Total Bilirubin     (0.2-1.0)  mg/dL


 


AST     (15-37)  U/L


 


ALT     (14-59)  U/L


 


Alkaline Phosphatase     ()  U/L


 


Total Protein     (6.4-8.2)  g/dl


 


Albumin     (3.4-5.0)  g/dl


 


Globulin     gm/dL


 


Albumin/Globulin Ratio     (1-2)  


 


SARS-CoV-2 RNA (LUL)  Negative    (NEGATIVE)  














  21 Range/Units





  06:20 06:20 


 


WBC   8.33  (3.98-10.04)  K/mm3


 


RBC   3.87 L  (3.98-5.22)  M/mm3


 


Hgb   11.1 L  (11.2-15.7)  gm/dl


 


Hct   33.2 L  (34.1-44.9)  %


 


MCV   85.8  (79.4-94.8)  fl


 


MCH   28.7  (25.6-32.2)  pg


 


MCHC   33.4  (32.2-35.5)  g/dl


 


RDW Std Deviation   47.5 H  (36.4-46.3)  fL


 


Plt Count   201  (182-369)  K/mm3


 


MPV   10.0  (9.4-12.3)  fl


 


Neut % (Auto)   89.6 H  (34.0-71.1)  %


 


Lymph % (Auto)   4.4 L  (19.3-51.7)  %


 


Mono % (Auto)   5.5  (4.7-12.5)  %


 


Eos % (Auto)   0 L  (0.7-5.8)  


 


Baso % (Auto)   0.1  (0.1-1.2)  %


 


Neut # (Auto)   7.46 H  (1.56-6.13)  K/mm3


 


Lymph # (Auto)   0.37 L  (1.18-3.74)  K/mm3


 


Mono # (Auto)   0.46 H  (0.24-0.36)  K/mm3


 


Eos # (Auto)   0.00 L  (0.04-0.36)  K/mm3


 


Baso # (Auto)   0.01  (0.01-0.08)  K/mm3


 


Manual Slide Review   Abnormal smear  


 


Sodium  140   (136-145)  mEq/L


 


Potassium  3.1 L   (3.5-5.1)  mEq/L


 


Chloride  106   ()  mEq/L


 


Carbon Dioxide  23   (21-32)  mEq/L


 


Anion Gap  14.1   (5-15)  


 


BUN  5 L   (7-18)  mg/dL


 


Creatinine  0.6   (0.55-1.02)  mg/dL


 


Est Cr Clr Drug Dosing  116.50   mL/min


 


Estimated GFR (MDRD)  > 60   (>60)  mL/min


 


BUN/Creatinine Ratio  8.3 L   (14-18)  


 


Glucose  138 H   (70-99)  mg/dL


 


Calcium  7.9 L   (8.5-10.1)  mg/dL


 


Total Bilirubin  0.3   (0.2-1.0)  mg/dL


 


AST  27   (15-37)  U/L


 


ALT  34   (14-59)  U/L


 


Alkaline Phosphatase  41 L   ()  U/L


 


Total Protein  6.2 L   (6.4-8.2)  g/dl


 


Albumin  2.7 L   (3.4-5.0)  g/dl


 


Globulin  3.5   gm/dL


 


Albumin/Globulin Ratio  0.8 L   (1-2)  


 


SARS-CoV-2 RNA (LUL)    (NEGATIVE)  











Result Diagrams: 


                                 21 06:20





                                 21 06:20


Problem List Initiated/Reviewed/Updated: Yes


Orders Last 24hrs: 


                               Active Orders 24 hr











 Category Date Time Status


 


 Admission Status [Patient Status] [ADT] Routine ADT  21 16:56 Active


 


 Enema [RC] ASDIRECTED Care  21 02:49 Active


 


 Notify Provider Consults [RC] ASDIRECTED Care  21 07:43 Active


 


 Consult to Physician [CONS] Stat Cons  21 07:41 Active


 


 Abdomen 2V AP Flat Upright [CR] Urgent Exams  21 08:47 Ordered


 


 TSH [CHEM] Routine Lab  21 06:20 Received


 


 Acetaminophen [Tylenol] Med  21 17:53 Active





 650 mg RECTAL Q6H PRN   


 


 Glycerin [Adult Glycerin] Med  21 22:06 Active





 1 supp RECTAL ONETIME PRN   


 


 Levothyroxine [Synthroid] Med  21 09:00 Active





 50 mcg PO ACBREAKFAST   


 


 Potassium Chloride 40 meq Med  21 19:15 Active





 Dextrose 5%-Lactated Ringers 1,000 ml   





 IV Q7H   


 


 Potassium Chloride [KCl in Water 10 MEQ/100 ML] 10 meq Med  21 09:30 

Active





 Premix Bag 1 bag   





 IV Q1H   


 


 Simethicone Med  21 20:48 Active





 80 mg PO Q4H PRN   








                                Medication Orders





Acetaminophen (Acetaminophen 650 Mg Supp)  650 mg RECTAL Q6H PRN


   PRN Reason: Abdominal Pain


Glycerin (Glycerin Adult 2 Gm Supp)  1 supp RECTAL ONETIME PRN


   PRN Reason: Constipation


Hydroxyzine HCl (Hydroxyzine Hcl 25 Mg/Ml Sdv)  50 mg IM Q6H PRN


   PRN Reason: Nausea


   Last Admin: 21 07:51  Dose: 50 mg


   Documented by: CARYN


Dextrose/Lactated Ringer's (Dextrose 5%-Lactated Ringers)  1,000 mls @ 150 

mls/hr IV [facility]ED [facility]


   Last Admin: 21 16:44  Dose: 150 mls/hr


   Documented by: ASHER


   Infusion: 21 16:44  Dose: 150 mls/hr


   Documented by: ASHER


   Admin: 21 10:30  Dose: 150 mls/hr


   Documented by: ASHER


   Infusion: 21 04:07  Dose: 150 mls/hr


   Documented by: ASHER


   Admin: 21 21:26  Dose: 150 mls/hr


   Documented by: SVEN


   Infusion: 21 16:53  Dose: 150 mls/hr


   Documented by: SVEN


   Admin: 21 10:12  Dose: 150 mls/hr


   Documented by: CARYN


   Infusion: 21 10:12  Dose: 150 mls/hr


   Documented by: CARYN


   Admin: 21 05:13  Dose: 150 mls/hr


   Documented by: EARLE


Promethazine HCl 25 mg/ Sodium (Chloride)  51 mls @ 100 mls/hr IV Q4H PRN


   PRN Reason: Nausea


   Last Admin: 21 08:57  Dose: 100 mls/hr


   Documented by: BLAYNE


   Infusion: 21 02:16  Dose: 100 mls/hr


   Documented by: BLAYNE


   Admin: 21 01:45  Dose: 100 mls/hr


   Documented by: SVEN


   Infusion: 21 20:57  Dose: 100 mls/hr


   Documented by: SVEN


   Admin: 21 20:26  Dose: 100 mls/hr


   Documented by: SVEN


   Infusion: 21 16:30  Dose: 100 mls/hr


   Documented by: SVEN


   Admin: 21 15:59  Dose: 100 mls/hr


   Documented by: CARYN


Potassium Chloride 40 meq/ (Dextrose/Lactated Ringer's)  1,020 mls @ 150 mls/hr 

IV Q7H COURTNEY


   Stop: 21 23:02


   Last Admin: 21 08:58  Dose: 150 mls/hr


   Documented by: JULIAN


   Infusion: 21 08:58  Dose: 150 mls/hr


   Documented by: JULIAN


   Admin: 21 02:24  Dose: 150 mls/hr


   Documented by: JOSE


   Infusion: 21 02:24  Dose: 150 mls/hr


   Documented by: JOSE


   Admin: 21 19:39  Dose: 150 mls/hr


   Documented by: KAYLA


Potassium Chloride 10 meq/ (Premix)  100 mls @ 100 mls/hr IV Q1H COURTNEY


   Stop: 21 13:29


Levothyroxine Sodium (Levothyroxine 50 Mcg Tab)  50 mcg PO ACBREAKFAST COURTNEY


   Last Admin: 21 08:59  Dose: 50 mcg


   Documented by: [id number]


Ondansetron HCl (Ondansetron 4 Mg/2 Ml Sdv)  4 mg IVPUSH Q4H PRN


   PRN Reason: Nausea


   Last Admin: 21 04:33  Dose: 4 mg


   Documented by: KAYLA


Promethazine HCl (Promethazine 25 Mg Tab)  25 mg PO Q4H PRN


   PRN Reason: Nausea/Vomiting


   Last Admin: 21 13:20  Dose: 25 mg


   Documented by: ASHER


   Admin: 21 06:55  Dose: 25 mg


   Documented by: KAILYN


Simethicone (Simethicone 80 Mg Tab.Chew)  80 mg PO Q4H PRN


   PRN Reason: Gas


   Last Admin: 21 22:14  Dose: 80 mg


   Documented by: KAYLA








Assessment/Plan Comment:: 





Nausea and emesis. continue IV phenergan. Labs pending. 


Can try rectal phenergan if keeps down food and fluids with IV.





Assessment at time of observation admission:





1.  22-week intrauterine pregnancy





2.  Nausea with emesis





3.  Moderate abdominal distention, discomfort





4.  Has not had a bowel movement for the last 3 days plus.





5.  Mild hypokalemia treated with IV potassium supplementation





6.  Normal gallbladder ultrasound, normal OB ultrasound done 2 days ago.





Plan:





1.  General surgery consultation.  We will do a flat plate of the abdomen and 

proceed with therapy directed by the flatplate.





2.  Continue IV fluid hydration with potassium supplementation Detail Level: Detailed No pallor, no cervical/supraclavicular/inguinal adenopathy.  ~Khang Sanchez PA-C

## 2021-07-28 NOTE — PCM.PREANE
Preanesthetic Assessment





- Procedure


Proposed Procedure: 





Exploratory laparotomy





- Anesthesia/Transfusion/Family Hx


Anesthesia History: No Prior Anesthesia


Family History of Anesthesia Reaction: No


Transfusion History: No Prior Transfusion(s)


Intubation History: Unknown





- Review of Systems


General: No Symptoms (22 weeks pregnant), Fatigue


Pulmonary: No Symptoms


Cardiovascular: No Symptoms


Gastrointestinal: Abdominal Pain (6/10), Constipation, Decreased Appetite, 

Nausea, Vomiting


Neurological: No Symptoms


Other: Reports: None, Thyroid Problems (Hypothyroid)





- Physical Assessment


NPO Status Date: 07/28/21


NPO Status Time: 08:00 (water)


Vital Signs: 





                                Last Vital Signs











Temp  36.8 C   07/28/21 08:03


 


Pulse  75   07/28/21 08:03


 


Resp  16   07/28/21 08:03


 


BP  105/65   07/28/21 08:03


 


Pulse Ox  96   07/28/21 08:03











Height: 1.6 m


Weight: 57.606 kg


ASA Class: 2


Mental Status: Alert & Oriented x3


Airway Class: Mallampati = 2


Dentition: Reports: Normal Dentition, Caries (bottom retainer)


Thyro-Mental Finger Breadths: 3


Mouth Opening Finger Breadths: 3


ROM/Head Extension: Full


Lungs: Clear to Auscultation, Normal Respiratory Effort


Cardiovascular: Regular Rate, Regular Rhythm, No Murmurs





- Lab


Values: 





                             Laboratory Last Values











WBC  8.33 K/mm3 (3.98-10.04)   07/28/21  06:20    


 


RBC  3.87 M/mm3 (3.98-5.22)  L  07/28/21  06:20    


 


Hgb  11.1 gm/dl (11.2-15.7)  L  07/28/21  06:20    


 


Hct  33.2 % (34.1-44.9)  L  07/28/21  06:20    


 


MCV  85.8 fl (79.4-94.8)   07/28/21  06:20    


 


MCH  28.7 pg (25.6-32.2)   07/28/21  06:20    


 


MCHC  33.4 g/dl (32.2-35.5)   07/28/21  06:20    


 


RDW Std Deviation  47.5 fL (36.4-46.3)  H  07/28/21  06:20    


 


Plt Count  201 K/mm3 (182-369)   07/28/21  06:20    


 


MPV  10.0 fl (9.4-12.3)   07/28/21  06:20    


 


Neut % (Auto)  89.6 % (34.0-71.1)  H  07/28/21  06:20    


 


Lymph % (Auto)  4.4 % (19.3-51.7)  L  07/28/21  06:20    


 


Mono % (Auto)  5.5 % (4.7-12.5)   07/28/21  06:20    


 


Eos % (Auto)  0  (0.7-5.8)  L  07/28/21  06:20    


 


Baso % (Auto)  0.1 % (0.1-1.2)   07/28/21  06:20    


 


Neut # (Auto)  7.46 K/mm3 (1.56-6.13)  H  07/28/21  06:20    


 


Lymph # (Auto)  0.37 K/mm3 (1.18-3.74)  L  07/28/21  06:20    


 


Mono # (Auto)  0.46 K/mm3 (0.24-0.36)  H  07/28/21  06:20    


 


Eos # (Auto)  0.00 K/mm3 (0.04-0.36)  L  07/28/21  06:20    


 


Baso # (Auto)  0.01 K/mm3 (0.01-0.08)   07/28/21  06:20    


 


Manual Slide Review  Abnormal smear   07/28/21  06:20    


 


Sodium  140 mEq/L (136-145)   07/28/21  06:20    


 


Potassium  3.1 mEq/L (3.5-5.1)  L  07/28/21  06:20    


 


Chloride  106 mEq/L ()   07/28/21  06:20    


 


Carbon Dioxide  23 mEq/L (21-32)   07/28/21  06:20    


 


Anion Gap  14.1  (5-15)   07/28/21  06:20    


 


BUN  5 mg/dL (7-18)  L  07/28/21  06:20    


 


Creatinine  0.6 mg/dL (0.55-1.02)   07/28/21  06:20    


 


Est Cr Clr Drug Dosing  116.50 mL/min  07/28/21  06:20    


 


Estimated GFR (MDRD)  > 60 mL/min (>60)   07/28/21  06:20    


 


BUN/Creatinine Ratio  8.3  (14-18)  L  07/28/21  06:20    


 


Glucose  138 mg/dL (70-99)  H  07/28/21  06:20    


 


Calcium  7.9 mg/dL (8.5-10.1)  L  07/28/21  06:20    


 


Total Bilirubin  0.3 mg/dL (0.2-1.0)   07/28/21  06:20    


 


AST  27 U/L (15-37)   07/28/21  06:20    


 


ALT  34 U/L (14-59)   07/28/21  06:20    


 


Alkaline Phosphatase  41 U/L ()  L  07/28/21  06:20    


 


Total Protein  6.2 g/dl (6.4-8.2)  L  07/28/21  06:20    


 


Albumin  2.7 g/dl (3.4-5.0)  L  07/28/21  06:20    


 


Globulin  3.5 gm/dL  07/28/21  06:20    


 


Albumin/Globulin Ratio  0.8  (1-2)  L  07/28/21  06:20    


 


TSH 3rd Generation  1.861 uIU/mL (0.358-3.74)   07/28/21  06:20    


 


Urine Color  Yellow  (Yellow)   07/26/21  03:49    


 


Urine Appearance  Clear  (Clear)   07/26/21  03:49    


 


Urine pH  6.0  (5.0-8.0)   07/26/21  03:49    


 


Ur Specific Gravity  1.025  (1.005-1.030)   07/26/21  03:49    


 


Urine Protein  Negative  (Negative)   07/26/21  03:49    


 


Urine Glucose (UA)  Negative  (Negative)   07/26/21  03:49    


 


Urine Ketones  4+  (Negative)  H  07/26/21  03:49    


 


Urine Occult Blood  Negative  (Negative)   07/26/21  03:49    


 


Urine Nitrite  Negative  (Negative)   07/26/21  03:49    


 


Urine Bilirubin  Negative  (Negative)   07/26/21  03:49    


 


Urine Urobilinogen  0.2  (0.2-1.0)   07/26/21  03:49    


 


Ur Leukocyte Esterase  Trace  (Negative)  H  07/26/21  03:49    


 


Urine RBC  0-5 /hpf (0-5)   07/26/21  03:49    


 


Urine WBC  0-5 /hpf (0-5)   07/26/21  03:49    


 


Ur Squamous Epith Cells  5-10 /hpf (0-5)  H  07/26/21  03:49    


 


Urine Bacteria  Moderate /hpf (FEW)  H  07/26/21  03:49    


 


Urine Mucus  Few /hpf (FEW)   07/26/21  03:49    


 


SARS-CoV-2 RNA (LUL)  Negative  (NEGATIVE)   07/27/21  09:16    








Above labs reviewed and noted and within acceptable ranges to proceed with 

scheduled procedure.





- Allergies


Allergies/Adverse Reactions: 


                                    Allergies











Allergy/AdvReac Type Severity Reaction Status Date / Time


 


No Known Allergies Allergy   Verified 07/26/21 03:54














- Anesthesia Plan


Pre-Op Medication Ordered: None, Other (IV reglan 10mg and P.O bicitra 30 ml's 

both at 1035)





- Acknowledgements


Anesthesia Type Planned: General Anesthesia


Pt an Appropriate Candidate for the Planned Anesthesia: Yes


Alternatives and Risks of Anesthesia Discussed w Pt/Guardian: Yes


Pt/Guardian Understands and Agrees with Anesthesia Plan: Yes





PreAnesthesia Questionnaire


HEENT History: Reports: None


Cardiovascular History: Reports: None


Respiratory History: Reports: None


Gastrointestinal History: Reports: None


Genitourinary History: Reports: None


OB/GYN History: Reports: Pregnancy


Musculoskeletal History: Reports: None


Neurological History: Reports: None


Psychiatric History: Reports: None


Endocrine/Metabolic History: Reports: Hypothyroidism


Hematologic History: Reports: None


Immunologic History: Reports: None


Oncologic (Cancer) History: Reports: None


Dermatologic History: Reports: None





- Past Surgical History


Neurological Surgical History: Reports: None


Musculoskeletal Surgical History: Reports: None





- SUBSTANCE USE


Tobacco Use Status *Q: Never Tobacco User


Second Hand Smoke Exposure: No


Recreational Drug Use History: No





- HOME MEDS


Home Medications: 


                                    Home Meds





Levothyroxine Sodium [Levothyroxine] 50 mcg PO DAILY 07/26/21 [History]


Prenatal No122/Iron/Folic Acid [Prenatal Multi Tablet] 1 each PO DAILY 07/26/21 

[History]











- CURRENT (IN HOUSE) MEDS


Current Meds: 





                               Current Medications





Acetaminophen (Acetaminophen 650 Mg Supp)  650 mg RECTAL Q6H PRN


   PRN Reason: Abdominal Pain


Glycerin (Glycerin Adult 2 Gm Supp)  1 supp RECTAL ONETIME PRN


   PRN Reason: Constipation


Hydroxyzine HCl (Hydroxyzine Hcl 25 Mg/Ml Sdv)  50 mg IM Q6H PRN


   PRN Reason: Nausea


   Last Admin: 07/26/21 07:51 Dose:  50 mg


   Documented by: 


Dextrose/Lactated Ringer's (Dextrose 5%-Lactated Ringers)  1,000 mls @ 150 

mls/hr IV ASDIRECTED [facility]


   Last Admin: 07/27/21 16:44 Dose:  150 mls/hr


   Documented by: 


Promethazine HCl 25 mg/ Sodium (Chloride)  51 mls @ 100 mls/hr IV Q4H PRN


   PRN Reason: Nausea


   Last Admin: 07/27/21 08:57 Dose:  100 mls/hr


   Documented by: 


Potassium Chloride 40 meq/ (Dextrose/Lactated Ringer's)  1,020 mls @ 150 mls/hr 

IV Q7H [facility]


   Stop: 07/28/21 23:02


   Last Admin: 07/28/21 08:58 Dose:  150 mls/hr


   Documented by: 


Potassium Chloride 10 meq/ (Premix)  100 mls @ 100 mls/hr IV Q1H [facility]


   Stop: 07/28/21 13:29


   Last Admin: 07/28/21 09:49 Dose:  100 mls/hr


   Documented by: 


Levothyroxine Sodium (Levothyroxine 50 Mcg Tab)  50 mcg PO ACBREAKFAST [facility]


   Last Admin: 07/28/21 08:59 Dose:  50 mcg


   Documented by: 


Ondansetron HCl (Ondansetron 4 Mg/2 Ml Sdv)  4 mg IVPUSH Q4H PRN


   PRN Reason: Nausea


   Last Admin: 07/26/21 04:33 Dose:  4 mg


   Documented by: 


Promethazine HCl (Promethazine 25 Mg Tab)  25 mg PO Q4H PRN


   PRN Reason: Nausea/Vomiting


   Last Admin: 07/27/21 13:20 Dose:  25 mg


   Documented by: 


Simethicone (Simethicone 80 Mg Tab.Chew)  80 mg PO Q4H PRN


   PRN Reason: Gas


   Last Admin: 07/27/21 22:14 Dose:  80 mg


   Documented by: 





Discontinued Medications





Acetaminophen (Acetaminophen 650 Mg Supp)  975 mg RECTAL NOW ONE


   Stop: 07/27/21 16:13


   Last Admin: 07/27/21 16:48 Dose:  Not Given


   Documented by: 


Acetaminophen (Acetaminophen 650 Mg Supp) Confirm Administered Dose 650 mg 

.ROUTE .STK-MED ONE


   Stop: 07/27/21 17:44


   Last Admin: 07/27/21 18:00 Dose:  650 mg


   Documented by: 


Al Hydroxide/Mg Hydroxide 30 (ml/ Lidocaine HCl 15 ml)  0 ml PO ONETIME ONE


   Stop: 07/26/21 05:44


   Last Admin: 07/26/21 05:58 Dose:  45 ml


   Documented by: 


Glycerin (Glycerin Adult 2 Gm Supp)  1 supp RECTAL ONETIME ONE


   Stop: 07/27/21 20:48


   Last Admin: 07/27/21 21:33 Dose:  Not Given


   Documented by: 


Glycerin (Glycerin Pediatric 1.2 Gm Supp)  3 gm RECTAL ONETIME ONE


   Stop: 07/27/21 21:14


   Last Admin: 07/27/21 21:28 Dose:  3 gm


   Documented by: 


Sodium Chloride (Normal Saline)  1,000 mls @ 999 mls/hr IV ONETIME ONE


   Stop: 07/26/21 05:16


   Last Admin: 07/26/21 04:28 Dose:  999 mls/hr


   Documented by: 


Promethazine HCl 25 mg/ Sodium (Chloride)  51 mls @ 100 mls/hr IV ONETIME ONE


   Stop: 07/26/21 11:15


   Last Admin: 07/26/21 10:46 Dose:  100 mls/hr


   Documented by: 


Potassium Chloride 40 meq/ (Dextrose/Lactated Ringer's)  1,020 mls @ 150 mls/hr 

IV ASDIRECTED [facility]


   Stop: 07/31/21 01:02


Levothyroxine Sodium (Levothyroxine 50 Mcg Tab)  50 mcg PO ACBREAKFAST [facility]


Levothyroxine Sodium (Levothyroxine 50 Mcg Tab)  50 mcg PO ONETIME ONE


   Stop: 07/27/21 10:46


   Last Admin: 07/27/21 13:11 Dose:  50 mcg


   Documented by:

## 2021-07-29 NOTE — PCM.SN.2
- Free Text/Narrative


Note: 





28 yo woman, 22 weeks pregnant, presented with near complete colonic obstruction

from tumor in the descending colon.


S/p laparotomy with segmental colectomy and end colostomy on 7/28.





S: no issues overnight. Pain manageable. 





O:


AF-sinus tachycardia around 100 bpm, stable, other VS wnl


NG output ~250, non-bilious. Small amount of serosanguinous fluid in ostomy bag.

Ample urine output. 


Awake and alert, appears relatively comfortable.


Breathing comfortably on room air


Abd slightly distended. Incision dressing with some minor sanguinous seep 

through. colon mucosa appears dark red.


[place] with clear output.


Labs reviewed, CBC and BMP within normal limits. 





A:


Appears to be doing well POD 1 after resection of presumed colon cancer. No 

stool output or significant amount of gas in ostomy bag. 





P:


-continue scheduled tylenol with oxycodone and dilaudid as needed for pain


-IS, OOB with PT today


-change IVF to D5 1/2 NS w KCl @ 50 cc/hr


-remove NG, start clear liquid diet. Await return of bowel function


-remove [place], f/u trial of void


-flagyl to discontinue within 24 hrs post op


-repeat CBC, BMP tomorrow. Add Mg, CEA level to AM labs tomorrow


-heparin 5000 u SC for DVT ppx

## 2021-07-29 NOTE — [id number]
Post Anesthesia Note





- EVALUATION WITHIN 48HRS OF ANESTHETIC


Vital Signs in Normal Range: Yes


Patient Participated in Evaluation: Yes


Respiratory Function Stable: Yes


Airway Patent: Yes


Cardiovascular Function Stable: Yes


Hydration Status Stable: Yes


Pain Control Satisfactory: Yes


Nausea and Vomiting Control Satisfactory: Yes


Mental Status Recovered: Yes


Vital Signs: 


                                Last Vital Signs











Temp  98.9 F   07/29/21 04:00


 


Pulse  95   07/29/21 04:00


 


Resp  16   07/29/21 04:00


 


BP  132/89   07/29/21 04:00


 


Pulse Ox  97   07/29/21 04:00

## 2021-07-30 NOTE — PCM.SN.2
- Free Text/Narrative


Note: 








28 yo woman, 22 weeks pregnant, presented with near complete colonic obstruction

from tumor in the descending colon.


S/p laparotomy with segmental colectomy and end colostomy on 7/28.





S: no issues overnight. Pain improved. Ambulating, voiding, tolerating clear 

liquids. No output from colostomy. 





O:


AF-sinus tachycardia around 100 bpm, stable, other VS wnl


Awake and alert, appears comfortable


Breathing comfortably on room air


Abd slightly distended. Incision dressing clean, dry, intact. colon mucosa 

appears dark red/ obscured by mucus


Labs reviewed, within normal range except Mg slightly low at 1.6. 





A:


Appears to be doing well POD 2 after resection of presumed colon cancer. No 

stool output or significant amount of gas in ostomy bag. Fetal heart tones have 

been normal. Pathology pending. 





P:


-continue scheduled tylenol with oxycodone and dilaudid as needed for pain


-IS, OOB


-change IVF to D5 1/2 NS w KCl @ 20 cc/hr


-3 g Mg sulfate IV


-trial regular diet, slow


-no repeat labs unless clinically indicated


-heparin 5000 u SC for DVT ppx


-await return of bowel function

## 2021-07-31 NOTE — PCM.SN.2
- Free Text/Narrative


Note: 








26 yo woman, 22 weeks pregnant, presented with near complete colonic obstruction

from tumor in the descending colon.


S/p laparotomy with segmental colectomy and end colostomy on 7/28.





S: no issues overnight. Pain well controlled. Ambulating, voiding, tolerating 

regular diet. Reporting some gas output from colostomy. 





O:


AF-sinus tachycardia around 100 bpm, stable, other VS wnl


Awake and alert, appears comfortable and in good spirits


Breathing comfortably on room air


Abd slightly distended, less tender. Incision dressing clean, dry, intact. colon

mucosa appears dark red/ obscured by mucus





A:


Appears to be doing well POD 3 after resection of presumed colon cancer. Minimal

output from colostomy so far. Fetal heart tones have been normal. Pathology 

pending. 





P:


-continue scheduled tylenol with oxycodone and dilaudid as needed for pain


-IS, OOB


-marvin


-regular diet, waiting for more ostomy output


-no repeat labs unless clinically indicated


-heparin 5000 u SC for DVT ppx

## 2021-08-01 NOTE — PCM.SN.2
- Free Text/Narrative


Note: 








28 yo woman, 22 weeks pregnant, presented with near complete colonic obstruction

from tumor in the descending colon.


S/p laparotomy with segmental colectomy and end colostomy on 7/28.





S: no issues overnight. Pain well controlled. Ambulating, voiding, tolerating 

regular diet. Colostomy now with stool and gas output. 





O:


AF-VSS


Awake and alert, appears comfortable and in good spirits


Breathing comfortably on room air


Abd less distended, colostomy with brown liquid stool and gas. 





A:


Appears to be doing well POD 4 after resection of presumed colon cancer. Fetal 

heart tones have been normal. Pathology pending. 





P:


-continue scheduled tylenol with oxycodone and dilaudid as needed for pain


-IS, OOB


-medlocked


-regular diet


-no repeat labs unless clinically indicated


-heparin 5000 u SC for DVT ppx


-plan for discharge to home tomorrow if continuing to do well

## 2021-08-02 NOTE — PCM.DCSUM1
**Discharge Summary





- Hospital Course


Free Text/Narrative:: 





Mrs. Corea is a 26 yo woman @ 22 weeks gestation with first pregnancy who was 

admitted with abdominal pain, bloating, vomiting and obstipation on 7/26/2021. 

Abdominal plain films on 7/28 showed complete colonic obstruction at the 

descending colon. The patient was taken to the OR promptly. An obstructing 

malignant intraluminal mass was identified. An oncologic resection was performed

and the patient was left with an end colostomy and Kei pouch. She tolerated

the operation well, and was kept in house until she had regular colostomy output

and was able to tolerate a regular diet. She was deemed fit for discharge to 

home on POD 5. 


Diagnosis: Stroke: No





- Discharge Data


Discharge Date: 08/02/21


Discharge Disposition: Home, Self-Care 01


Condition: Good





- Referral to Home Health


Primary Care Physician: 


LEE Dover








- Patient Summary/Data


Operative Procedure(s) Performed: left colectomy with end colostomy


Consults: 


                                  Consultations





07/28/21 07:41


Consult to Physician [CONS] Stat 





07/29/21 07:45


Consult to Physical Therapy [PT Evaluation and Treatment] [CONS] Routine 





07/29/21 07:46


Consult to Occupational Therapy [OT Evaluation and Treatment] [CONS] Routine 














- Patient Instructions


Diet: Usual Diet as Tolerated


Activity: As Tolerated, No Lifting Over 10 Pounds


Showering/Bathing: May Shower, No Tub Bathing/Swimming


Wound/Incision Care: Keep Operative Site/Wound Site Clean and Dry


Notify Provider of: Fever, Increased Pain, Swelling and Redness, Drainage, 

Nausea and/or Vomiting





- Discharge Plan


*PRESCRIPTION DRUG MONITORING PROGRAM REVIEWED*: Not Applicable


*COPY OF PRESCRIPTION DRUG MONITORING REPORT IN PATIENT KATHIA: Not Applicable


Prescriptions/Med Rec: 


oxyCODONE 5 mg PO Q4H PRN #20 tab


 PRN Reason: Pain


Home Medications: 


                                    Home Meds





Levothyroxine Sodium [Levothyroxine] 50 mcg PO DAILY 07/26/21 [History]


Prenatal No122/Iron/Folic Acid [Prenatal Multi Tablet] 1 each PO DAILY 07/26/21 

[History]


oxyCODONE 5 mg PO Q4H PRN #20 tab 08/02/21 [Rx]








Oxygen Therapy Mode: Room Air


Patient Handouts:  Colostomy Surgery, Adult, Care After, Colostomy Home Guide, 

Adult, Colostomy Surgery, Adult, Exploratory Laparotomy, Adult


Referrals: 


Mariya Posey PA [Primary Care Provider] - 


John Blanco MD [Physician] - 





- Discharge Summary/Plan Comment


DC Time >30 min.: No





- Patient Data


Vitals - Most Recent: 


                                Last Vital Signs











Temp  36.8 C   08/02/21 06:44


 


Pulse  81   08/02/21 06:44


 


Resp  16   08/02/21 06:44


 


BP  128/87   08/02/21 06:44


 


Pulse Ox  98   08/02/21 06:44











Weight - Most Recent: 56.518 kg


I&O - Last 24 hours: 


                                 Intake & Output











 08/01/21 08/02/21 08/02/21





 22:59 06:59 14:59


 


Intake Total 800 1000 


 


Output Total 200 200 


 


Balance 600 800 











Med Orders - Current: 


                               Current Medications





Acetaminophen (Acetaminophen 325 Mg Tab)  975 mg PO Q8H [facility]


   Last Admin: 08/02/21 06:45 Dose:  975 mg


   Documented by: 


Heparin Sodium (Porcine) (Heparin Sodium 5,000 Units/Ml Vial)  5,000 units 

SUBCUT Q8H [facility]


   Last Admin: 08/02/21 06:49 Dose:  Not Given


   Documented by: 


Hydromorphone HCl (Hydromorphone 1 Mg/Ml Syringe)  1 mg IVPUSH Q3H PRN


   PRN Reason: Pain


   Last Admin: 07/29/21 23:49 Dose:  1 mg


   Documented by: 


Hydroxyzine HCl (Hydroxyzine Hcl 25 Mg/Ml Sdv)  50 mg IM Q6H PRN


   PRN Reason: Nausea


   Last Admin: 07/26/21 07:51 Dose:  50 mg


   Documented by: 


Promethazine HCl 25 mg/ Sodium (Chloride)  51 mls @ 100 mls/hr IV Q4H PRN


   PRN Reason: Nausea


   Last Admin: 07/27/21 08:57 Dose:  100 mls/hr


   Documented by: 


Levothyroxine Sodium (Levothyroxine 50 Mcg Tab)  50 mcg PO ACBREAKFAST [place]


   Last Admin: 08/02/21 06:45 Dose:  50 mcg


   Documented by: 


Ondansetron HCl (Ondansetron 4 Mg/2 Ml Sdv)  4 mg IVPUSH Q4H PRN


   PRN Reason: Nausea


   Last Admin: 07/26/21 04:33 Dose:  4 mg


   Documented by: 


Oxycodone HCl (Oxycodone 5 Mg Tab)  5 mg PO Q4H PRN


   PRN Reason: Pain (moderate 4-6)


   Last Admin: 08/01/21 06:44 Dose:  5 mg


   Documented by: 


[place] Multivit/[place]/Iron/Folic Ac (Prenatal Multivitamin With Calcium/Folic 

Acid/Iron Tab)  1 each PO DAILY@1900 [facility]


   Last Admin: 08/01/21 19:21 Dose:  1 each


   Documented by: 


Promethazine HCl (Promethazine 25 Mg Tab)  25 mg PO Q4H PRN


   PRN Reason: Nausea/Vomiting


   Last Admin: 07/27/21 13:20 Dose:  25 mg


   Documented by: 


Simethicone (Simethicone 80 Mg Tab.Chew)  80 mg PO Q4H PRN


   PRN Reason: Gas


   Last Admin: 07/27/21 22:14 Dose:  80 mg


   Documented by: 





Discontinued Medications





Acetaminophen (Acetaminophen 650 Mg Supp)  975 mg RECTAL NOW ONE


   Stop: 07/27/21 16:13


   Last Admin: 07/27/21 16:48 Dose:  Not Given


   Documented by: 


Acetaminophen (Acetaminophen 650 Mg Supp) Confirm Administered Dose 650 mg 

.ROUTE .STK-MED ONE


   Stop: 07/27/21 17:44


   Last Admin: 07/27/21 18:00 Dose:  650 mg


   Documented by: 


Acetaminophen (Acetaminophen 650 Mg Supp)  650 mg RECTAL Q6H PRN


   PRN Reason: Abdominal Pain


Bupivacaine HCl/Epinephrine Bitart (Bupivacaine 0.5%/Epinephrine 1:200,000 50 Ml

 Mdv) Confirm Administered Dose 50 ml .ROUTE .STK-MED ONE


   Stop: 07/28/21 10:34


   Last Admin: 07/28/21 11:30 Dose:  20 ml


   Documented by: 


Cefazolin Sodium (Cefazolin 1 Gm Vial) Confirm Administered Dose 2 gm .ROUTE 

.STK-MED ONE


   Stop: 07/28/21 10:57


Citric Acid/Sodium Citrate (Citric Acid/Sodium Citrate Solution 30 Ml Cup) 

Confirm Administered Dose 30 ml .ROUTE .STK-MED ONE


   Stop: 07/28/21 10:30


   Last Admin: 07/28/21 10:43 Dose:  Not Given


   Documented by: 


Citric Acid/Sodium Citrate (Citric Acid/Sodium Citrate Solution 30 Ml Cup)  30 

ml PO ONETIME ONE


   Stop: 07/28/21 10:34


   Last Admin: 07/28/21 10:40 Dose:  30 ml


   Documented by: 


Al Hydroxide/Mg Hydroxide 30 (ml/ Lidocaine HCl 15 ml)  0 ml PO ONETIME ONE


   Stop: 07/26/21 05:44


   Last Admin: 07/26/21 05:58 Dose:  45 ml


   Documented by: 


Ephedrine Sulfate (Ephedrine 50 Mg/Ml Sdv) Confirm Administered Dose 50 mg 

.ROUTE .STK-MED ONE


   Stop: 07/28/21 11:37


Fentanyl (Fentanyl 250 Mcg/5 Ml Sdv) Confirm Administered Dose 250 mcg .ROUTE 

.STK-MED ONE


   Stop: 07/28/21 10:56


Fentanyl (Fentanyl 250 Mcg/5 Ml Sdv) Confirm Administered Dose 250 mcg .ROUTE 

.STK-MED ONE


   Stop: 07/28/21 11:46


Fentanyl (Fentanyl 100 Mcg/2 Ml Sdv)  50 mcg IVPUSH Q5M PRN


   PRN Reason: Pain


   Stop: 07/28/21 14:00


Fentanyl (Fentanyl 100 Mcg/2 Ml Sdv) Confirm Administered Dose 100 mcg .ROUTE 

.STK-MED ONE


   Stop: 07/28/21 12:57


Fentanyl (Fentanyl 100 Mcg/2 Ml Sdv) Confirm Administered Dose 100 mcg .ROUTE 

.STK-MED ONE


   Stop: 07/28/21 13:27


Glycerin (Glycerin Adult 2 Gm Supp)  1 supp RECTAL ONETIME ONE


   Stop: 07/27/21 20:48


   Last Admin: 07/27/21 21:33 Dose:  Not Given


   Documented by: 


Glycerin (Glycerin Pediatric 1.2 Gm Supp)  3 gm RECTAL ONETIME ONE


   Stop: 07/27/21 21:14


   Last Admin: 07/27/21 21:28 Dose:  3 gm


   Documented by: 


Glycerin (Glycerin Adult 2 Gm Supp)  1 supp RECTAL ONETIME PRN


   PRN Reason: Constipation


Glycopyrrolate (Glycopyrrolate 0.2 Mg/Ml 2 Ml Syringe) Confirm Administered Dose

 0.4 mg .ROUTE .STK-MED ONE


   Stop: 07/28/21 13:43


Hydromorphone HCl (Hydromorphone 0.5 Mg/0.5 Ml Syringe) Confirm Administered 

Dose 0.5 mg .ROUTE .STK-MED ONE


   Stop: 07/28/21 11:35


Hydromorphone HCl (Hydromorphone 0.5 Mg/0.5 Ml Syringe)  0.5 mg IVPUSH Q10M PRN


   PRN Reason: Pain (severe 7-10)


   Stop: 07/28/21 14:00


Hydromorphone HCl (Hydromorphone 0.5 Mg/0.5 Ml Syringe) Confirm Administered 

Dose 0.5 mg .ROUTE .STK-MED ONE


   Stop: 07/28/21 12:21


Hydromorphone HCl (Hydromorphone 0.5 Mg/0.5 Ml Syringe) Confirm Administered 

Dose 0.5 mg .ROUTE .STK-MED ONE


   Stop: 07/28/21 14:00


Dextrose/Lactated Ringer's (Dextrose 5%-Lactated Ringers)  1,000 mls @ 150 

mls/hr IV ASDIRECTED [facility]


   Last Admin: 07/27/21 16:44 Dose:  150 mls/hr


   Documented by: 


Sodium Chloride (Normal Saline)  1,000 mls @ 999 mls/hr IV ONETIME ONE


   Stop: 07/26/21 05:16


   Last Admin: 07/26/21 04:28 Dose:  999 mls/hr


   Documented by: 


Promethazine HCl 25 mg/ Sodium (Chloride)  51 mls @ 100 mls/hr IV ONETIME ONE


   Stop: 07/26/21 11:15


   Last Admin: 07/26/21 10:46 Dose:  100 mls/hr


   Documented by: 


Potassium Chloride 40 meq/ (Dextrose/Lactated Ringer's)  1,020 mls @ 150 mls/hr 

IV ASDIRECTED [facility]


   Stop: 07/31/21 01:02


Potassium Chloride 40 meq/ (Dextrose/Lactated Ringer's)  1,020 mls @ 150 mls/hr 

IV Q7H [facility]


   Stop: 07/28/21 23:02


   Last Admin: 07/28/21 08:58 Dose:  150 mls/hr


   Documented by: 


Potassium Chloride 10 meq/ (Premix)  100 mls @ 100 mls/hr IV Q1H [facility]


   Stop: 07/28/21 13:29


   Last Admin: 07/28/21 17:07 Dose:  Not Given


   Documented by: 


Lactated Ringer's (Ringers, Lactated) Confirm Administered Dose 1,000 mls @ as 

directed .ROUTE .STK-MED ONE


   Stop: 07/28/21 10:21


   Last Admin: 07/28/21 10:50 Dose:  Not Given


   Documented by: 


Lactated Ringer's (Ringers, Lactated)  1,000 mls @ 999 mls/hr IV .BOLUS ONE


   Stop: 07/28/21 11:42


   Last Admin: 07/28/21 10:48 Dose:  999 mls/hr


   Documented by: 


Lidocaine HCl (Xylocaine-Mpf 1%) Confirm Administered Dose 4 mls @ as directed 

.ROUTE .STK-MED ONE


   Stop: 07/28/21 10:56


Lactated Ringer's (Ringers, Lactated) Confirm Administered Dose 1,000 mls @ as 

directed .ROUTE .STK-MED ONE


   Stop: 07/28/21 11:39


Lactated Ringer's (Ringers, Lactated) Confirm Administered Dose 1,000 mls @ as 

directed .ROUTE .STK-MED ONE


   Stop: 07/28/21 12:55


Lactated Ringer's (Ringers, Lactated) Confirm Administered Dose 1,000 mls @ as 

directed .ROUTE .STK-MED ONE


   Stop: 07/28/21 13:46


Lactated Ringer's (Ringers, Lactated)  1,000 mls @ 100 mls/hr IV ASDIRECTED [facility]


   Last Admin: 07/29/21 02:11 Dose:  100 mls/hr


   Documented by: 


Metronidazole 500 mg/ Premix  100 mls @ 100 mls/hr IV Q8H [facility]


   Stop: 07/29/21 15:31


   Last Admin: 07/29/21 14:53 Dose:  100 mls/hr


   Documented by: 


Potassium Chloride/Dextrose/Sod Cl (D5 1/2 Ns W/ 20 Meq/L Kcl)  1,000 mls @ 50 

mls/hr IV ASDIRECTED [facility]


   Last Admin: 07/29/21 08:46 Dose:  50 mls/hr


   Documented by: 


Magnesium Sulfate/Dextrose 1 (gm/ Premix)  100 mls @ 100 mls/hr IV ONETIME ONE


   Stop: 07/30/21 09:29


   Last Admin: 07/30/21 08:39 Dose:  100 mls/hr


   Documented by: 


Magnesium Sulfate 2 gm/ Premix  50 mls @ 25 mls/hr IV ONETIME ONE


   Stop: 07/30/21 11:59


   Last Admin: 07/30/21 09:46 Dose:  25 mls/hr


   Documented by: 


Potassium Chloride/Dextrose/Sod Cl (D5 1/2 Ns W/ 20 Meq/L Kcl)  1,000 mls @ 20 

mls/hr IV ASDIRECTED [facility]


   Last Admin: 07/30/21 08:39 Dose:  20 mls/hr


   Documented by: 


Levothyroxine Sodium (Levothyroxine 50 Mcg Tab)  50 mcg PO ACBREAKFAST [facility]


   Last Admin: 07/28/21 23:17 Dose:  Not Given


   Documented by: 


Levothyroxine Sodium (Levothyroxine 50 Mcg Tab)  50 mcg PO ONETIME ONE


   Stop: 07/27/21 10:46


   Last Admin: 07/27/21 13:11 Dose:  50 mcg


   Documented by: 


Metoclopramide HCl (Metoclopramide 10 Mg/2 Ml Sdv) Confirm Administered Dose 10 

mg .ROUTE .STK-MED ONE


   Stop: 07/28/21 10:30


   Last Admin: 07/28/21 10:43 Dose:  Not Given


   Documented by: 


Metoclopramide HCl (Metoclopramide 10 Mg/2 Ml Sdv)  10 mg IVPUSH ONETIME ONE


   Stop: 07/28/21 10:34


   Last Admin: 07/28/21 10:40 Dose:  10 mg


   Documented by: 


Miscellaneous Medication (Phenylephrine Hcl In 0.9% Nacl 1 Mg/10 Ml Syringe) 

Confirm Administered Dose 1 mg .ROUTE .STK-MED ONE


   Stop: 07/28/21 11:57


Neostigmine Methylsulfate (Neostigmine Methylsulfate 5 Mg/5 Ml Syringe) Confirm 

Administered Dose 5 mg .ROUTE .STK-MED ONE


   Stop: 07/28/21 13:43


Ondansetron HCl (Ondansetron 4 Mg/2 Ml Sdv) Confirm Administered Dose 4 mg 

.ROUTE .STK-MED ONE


   Stop: 07/28/21 10:56


Ondansetron HCl (Ondansetron 4 Mg/2 Ml Sdv)  4 mg IVPUSH ONETIME PRN


   PRN Reason: Nausea/Vomiting


   Stop: 07/28/21 14:00


Propofol (Propofol 200 Mg/20 Ml Sdv) Confirm Administered Dose 200 mg .ROUTE 

.STK-MED ONE


   Stop: 07/28/21 10:56


Rocuronium Bromide (Rocuronium 50 Mg/5 Ml Vial) Confirm Administered Dose 50 mg 

.ROUTE .STK-MED ONE


   Stop: 07/28/21 10:56


Rocuronium Bromide (Rocuronium 50 Mg/5 Ml Vial) Confirm Administered Dose 50 mg 

.ROUTE .STK-MED ONE


   Stop: 07/28/21 13:26

## 2021-09-16 NOTE — PCM.PRNOTE
- Free Text/Narrative


Note: 





Date: 9/16/2021


Procedure: screening colonoscopy


History: obstructing sigmoid colon cancer, now with end colostomy


Endoscopist: John Blanco MD





Findings: minor gross appearance of proctitis in Kei pouch. No 

abnormalities identified otherwise.





Detailed Report:





The patient was taken to the endoscopy suite and placed in left lateral 

decubitus position.  Timeout was performed and monitored anesthesia care was 

initiated.  The anus appeared normal.  Digital rectal exam was unremarkable.  

The colonoscope was inserted into the rectum and advanced all the way to the 

proximal staple line which was approximately 30 cm from the anal verge.  On 

entry, there did appear to be some minor gross inflammatory changes in the 

rectum.  No polyps were identified.  After the rectum and been distended with 

insufflation, the inflammatory changes seemed pretty mild.  Air was suctioned 

from the pouch prior to withdrawal of the scope.  Next, the patient was 

positioned supine in the colonoscope was then inserted into the colostomy.  The 

scope was advanced to the cecum with ease.  The appendiceal orifice was 

visualized.  The impression of the gravid uterus could be seen at the ascending 

colon.  No polyps or other worrisome lesions were identified.  The prep was good

and mucosa was well visualized.  The scope was withdrawn and the ostomy bag 

reapplied.  The patient tolerated the procedure well.

## 2021-09-16 NOTE — [id number]
Post Anesthesia Note





- EVALUATION WITHIN 48HRS OF ANESTHETIC


Vital Signs in Normal Range: Yes


Patient Participated in Evaluation: Yes


Respiratory Function Stable: Yes


Airway Patent: Yes


Cardiovascular Function Stable: Yes


Hydration Status Stable: Yes


Pain Control Satisfactory: Yes


Nausea and Vomiting Control Satisfactory: Yes


Mental Status Recovered: Yes





- COMMENTS/OBSERVATIONS


Free Text/Narrative:: 





awake with no complaints

## 2021-09-16 NOTE — PCM.PREANE
Preanesthetic Assessment





- Procedure


Proposed Procedure: 





colonoscopy





- Anesthesia/Transfusion/Family Hx


Anesthesia History: Prior Anesthesia Without Reaction


Family History of Anesthesia Reaction: No


Transfusion History: No Prior Transfusion(s)


Intubation History: Unknown





- Review of Systems


General: No Symptoms


Pulmonary: No Symptoms


Cardiovascular: No Symptoms


Gastrointestinal: No Symptoms


Neurological: No Symptoms


Other: Reports: Thyroid Problems





- Physical Assessment


NPO Status Date: 09/15/21


NPO Status Time: 19:00


Vital Signs: 





119/74


67


100%


98.0


16


Height: 5 ft 3 in


Weight: 58 kg


ASA Class: 2


Mental Status: Alert & Oriented x3


Airway Class: Mallampati = 1


Dentition: Reports: Normal Dentition


Thyro-Mental Finger Breadths: 3


Mouth Opening Finger Breadths: 3


ROM/Head Extension: Full


Lungs: Clear to Auscultation, Normal Respiratory Effort


Cardiovascular: Regular Rate, Regular Rhythm





- Allergies


Allergies/Adverse Reactions: 


                                    Allergies











Allergy/AdvReac Type Severity Reaction Status Date / Time


 


No Known Allergies Allergy   Verified 21 03:54














- Blood


Blood Available: No





- Acknowledgements


Anesthesia Type Planned: MAC


Pt an Appropriate Candidate for the Planned Anesthesia: Yes


Alternatives and Risks of Anesthesia Discussed w Pt/Guardian: Yes


Pt/Guardian Understands and Agrees with Anesthesia Plan: Yes





PreAnesthesia Questionnaire


HEENT History: Reports: None


Cardiovascular History: Reports: None


Respiratory History: Reports: None


Gastrointestinal History: Reports: Other (See Below) (colon cancer)


Genitourinary History: Reports: None


OB/GYN History: Reports: Pregnancy


: 1 (29 weeks)


Musculoskeletal History: Reports: None


Neurological History: Reports: None


Psychiatric History: Reports: None


Endocrine/Metabolic History: Reports: Hypothyroidism


Hematologic History: Reports: None


Immunologic History: Reports: None


Oncologic (Cancer) History: Reports: Colon


Dermatologic History: Reports: None





- Past Surgical History


GI Surgical History: Reports: Colostomy, Other (See Below) (colon resection- 

colon cancer)


Neurological Surgical History: Reports: None


Musculoskeletal Surgical History: Reports: None





- SUBSTANCE USE


Tobacco Use Status *Q: Never Tobacco User


Tobacco Use Within Last Twelve Months: No


Second Hand Smoke Exposure: No


Days Per Week of Alcohol Use: 0


Recreational Drug Use History: No





- HOME MEDS


Home Medications: 


                                    Home Meds





Levothyroxine Sodium [Levothyroxine] 50 mcg PO DAILY 21 [History]


Prenatal No122/Iron/Folic Acid [Prenatal Multi Tablet] 1 each PO DAILY 21 

[History]


oxyCODONE 5 mg PO Q4H PRN #20 tab 21 [Rx]

## 2021-09-22 NOTE — PCM.CONS
H&P History of Present Illness





- General


Date of Service: 09/22/21


Admit Problem/Dx: 


colostomy prolapse


Source of Information: Patient


History Limitations: Reports: No Limitations





- History of Present Illness


Initial Comments - Free Text/Narative: 





Ms. Corea is a 29 yo woman who is 28 weeks pregnant and who underwent emergent 

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

[id number]. She has an end colostomy, and has had some minor issues with prolapse 

since she was last seen in clinic. She comes to the ER today with large bowel 

obstruction stemming from substantial stomal prolapse, with symptoms starting 

about 12 hours ago. 


  ** Abdomen


Pain Score (Numeric/FACES): 7





- Related Data


Allergies/Adverse Reactions: 


                                    Allergies











Allergy/AdvReac Type Severity Reaction Status Date / Time


 


No Known Allergies Allergy   Verified 09/22/21 07:43











Home Medications: 


                                    Home Meds





Levothyroxine Sodium [Levothyroxine] 50 mcg PO DAILY 07/26/21 [History]


Prenatal No122/Iron/Folic Acid [Prenatal Multi Tablet] 1 each PO DAILY 07/26/21 

[History]











Past Medical History


HEENT History: Reports: None


Cardiovascular History: Reports: None


Respiratory History: Reports: None


Gastrointestinal History: Reports: Other (See Below) (colon cancer)


Genitourinary History: Reports: None


OB/GYN History: Reports: Pregnancy


Musculoskeletal History: Reports: None


Neurological History: Reports: None


Psychiatric History: Reports: None


Endocrine/Metabolic History: Reports: Hypothyroidism


Hematologic History: Reports: None


Immunologic History: Reports: None


Oncologic (Cancer) History: Reports: Colon


Dermatologic History: Reports: None





- Past Surgical History


GI Surgical History: Reports: Colostomy


Neurological Surgical History: Reports: None


Musculoskeletal Surgical History: Reports: None





Social & Family History





- Tobacco Use


Tobacco Use Status *Q: Never Tobacco User





- Caffeine Use


Caffeine Use: Reports: None





- Recreational Drug Use


Recreational Drug Use: No





H&P Review of Systems





- Review of Systems:


Review Of Systems: See Below


General: Reports: Malaise


HEENT: Reports: No Symptoms


Pulmonary: Reports: No Symptoms


Cardiovascular: Reports: No Symptoms


Gastrointestinal: Reports: Abdominal Pain, Nausea, Vomiting


Genitourinary: Reports: No Symptoms


Musculoskeletal: Reports: No Symptoms


Skin: Reports: No Symptoms


Psychiatric: Reports: No Symptoms


Neurological: Reports: No Symptoms


Hematologic/Lymphatic: Reports: No Symptoms


Immunologic: Reports: No Symptoms





Exam





- Exam


Exam: See Below





- Vital Signs


Vital Signs: 


                                Last Vital Signs











Temp  36.6 C   09/22/21 07:40


 


Pulse  71   09/22/21 07:40


 


Resp  18   09/22/21 07:40


 


BP  144/83 H  09/22/21 07:40


 


Pulse Ox  97   09/22/21 07:40











Weight: 60.691 kg





- Exam


General: Alert, Oriented, Mild Distress


HEENT: Conjunctiva Clear


Neck: Supple


Lungs: Normal Respiratory Effort


Cardiovascular: Regular Rate


GI/Abdominal Exam: Other (colostomy prolapse, ~15 cm, with edematous and 

erythematous mucosa. Digitated deep to fascia without difficulty.)


Skin: Warm, Dry


Neuro Extensive - Mental Status: Alert, Oriented x3


Psychiatric: Normal Mood





Sepsis Event Note





- Focused Exam


Vital Signs: 


                                   Vital Signs











  Temp Pulse Resp BP Pulse Ox


 


 09/22/21 07:40  36.6 C  71  18  144/83 H  97














Consult PN Assessment/Plan


Procedures: 


Procedures





BLOOD TYPING SEROLOGIC ABO (05/25/21)


BLOOD TYPING SEROLOGIC RH(D) (05/25/21)


CHYLMD TRACH DNA AMP PROBE (05/25/21)


COLONOSCOPY THRU STOMA SPX (09/16/21)


COMPLETE CBC W/AUTO DIFF WBC (05/25/21)


DIAGNOSTIC SIGMOIDOSCOPY (09/16/21)


HEPATITIS B SURFACE AG IA (05/25/21)


HEPATITIS C AB TEST (05/25/21)


HIV-1 AG W/HIV-1 & -2 AB AG IA (05/25/21)


N.GONORRHOEAE DNA AMP PROB (05/25/21)


OB US >/= 14 WKS SNGL FETUS (07/15/21)


OB US FOLLOW-UP PER FETUS (08/20/21)


RBC ANTIBODY SCREEN (05/25/21)


RUBELLA ANTIBODY (05/25/21)


SARS-COV-2 COVID-19 AMP PRB (09/13/21)


SYPHILIS TEST NON-TREP QUAL (05/25/21)


URINALYSIS AUTO W/O SCOPE (05/25/21)


URINE CULTURE/COLONY COUNT (05/25/21)








Problem List Initiated/Reviewed/Updated: Yes


Plan: 


Stomal prolapse coated in table sugar. Pain medication and antiemetic 

administered. With two pairs of hands and some persistence, the prolapse was 

successfully completely reduced at bedside. 





Requesting Provider: rancho


Date Consult Requested: 09/22/21


Reason for Consult: stomal prolapse


Patient History Reviewed: Yes


Admission H&P Reviewed: Yes


Time Spent (in minutes): 30

## 2021-11-22 NOTE — PCM.PREANE
Preanesthetic Assessment





- Procedure


Proposed Procedure: 





Epidural





- Anesthesia/Transfusion/Family Hx


Anesthesia History: Prior Anesthesia Without Reaction


Family History of Anesthesia Reaction: No


Transfusion History: No Prior Transfusion(s)


Intubation History: Unknown





- Review of Systems


General: No Symptoms


Pulmonary: No Symptoms


Cardiovascular: No Symptoms


Gastrointestinal: No Symptoms (History of colon cancer with a colon resection 

surgery noted. Colostomy noted.)


Neurological: No Symptoms


Other: Reports: Thyroid Problems (Hypothyroid)





- Physical Assessment


NPO Status Date: 11/22/21


NPO Status Time: 10:30


Vital Signs: 





                                Last Vital Signs











Temp  36.6 C   11/22/21 03:10


 


Pulse  73   11/22/21 03:10


 


Resp  16   11/22/21 03:10


 


BP  150/95 H  11/22/21 03:10


 


Pulse Ox  98   11/22/21 03:10











Height: 1.6 m


Weight: 66.224 kg


ASA Class: 2


Mental Status: Alert & Oriented x3


Airway Class: Mallampati = 2


Dentition: Reports: Normal Dentition (bottom permanent retainer), Caries


Thyro-Mental Finger Breadths: 3


Mouth Opening Finger Breadths: 3


ROM/Head Extension: Full


Lungs: Clear to Auscultation, Normal Respiratory Effort


Cardiovascular: Regular Rate, Regular Rhythm, No Murmurs





- Lab


Values: 





                             Laboratory Last Values











WBC  6.96 K/mm3 (3.98-10.04)   11/22/21  03:20    


 


RBC  4.70 M/mm3 (3.98-5.22)   11/22/21  03:20    


 


Hgb  14.0 gm/dl (11.2-15.7)  D 11/22/21  03:20    


 


Hct  40.5 % (34.1-44.9)   11/22/21  03:20    


 


MCV  86.2 fl (79.4-94.8)   11/22/21  03:20    


 


MCH  29.8 pg (25.6-32.2)   11/22/21  03:20    


 


MCHC  34.6 g/dl (32.2-35.5)   11/22/21  03:20    


 


RDW Std Deviation  44.0 fL (36.4-46.3)   11/22/21  03:20    


 


Plt Count  114 K/mm3 (182-369)  L D 11/22/21  03:20    


 


MPV  TNP   11/22/21  03:20    


 


Neut % (Auto)  71.9 % (34.0-71.1)  H  11/22/21  03:20    


 


Lymph % (Auto)  16.7 % (19.3-51.7)  L  11/22/21  03:20    


 


Mono % (Auto)  9.2 % (4.7-12.5)   11/22/21  03:20    


 


Eos % (Auto)  1.3  (0.7-5.8)   11/22/21  03:20    


 


Baso % (Auto)  0.3 % (0.1-1.2)   11/22/21  03:20    


 


Neut # (Auto)  5.01 K/mm3 (1.56-6.13)   11/22/21  03:20    


 


Lymph # (Auto)  1.16 K/mm3 (1.18-3.74)  L  11/22/21  03:20    


 


Mono # (Auto)  0.64 K/mm3 (0.24-0.36)  H  11/22/21  03:20    


 


Eos # (Auto)  0.09 K/mm3 (0.04-0.36)   11/22/21  03:20    


 


Baso # (Auto)  0.02 K/mm3 (0.01-0.08)   11/22/21  03:20    


 


Manual Slide Review  Abnormal smear   11/22/21  03:20    


 


PT  9.2 SECONDS (9.7-12.0)  L  11/22/21  03:20    


 


INR  < 0.93   11/22/21  03:20    


 


BUN  13 mg/dL (7-18)   11/22/21  03:20    


 


Creatinine  0.8 mg/dL (0.55-1.02)   11/22/21  03:20    


 


Est Cr Clr Drug Dosing  86.60 mL/min  11/22/21  03:20    


 


Estimated GFR (MDRD)  > 60 mL/min (>60)   11/22/21  03:20    


 


Uric Acid  6.0 mg/dL (2.6-6.0)   11/22/21  03:20    


 


AST  24 U/L (15-37)   11/22/21  03:20    


 


ALT  17 U/L (14-59)   11/22/21  03:20    


 


Lactate Dehydrogenase  163 U/L ()   11/22/21  03:20    


 


Ur Random Creatinine  24.1 mg/dL (30.0-125.0)  L  11/22/21  05:25    


 


U Random Total Protein  < 6.0 mg/dL (0.0-11.8)   11/22/21  05:25    


 


Protein/Creatinin Ratio  TNP   11/22/21  05:25    


 


SARS-CoV-2 RNA (LUL)  Negative  (NEGATIVE)   11/22/21  04:00    


 


Blood Type  B POSITIVE   11/22/21  03:20    


 


Gel Antibody Screen  Negative   11/22/21  03:20    








Above labs reviewed and noted and within acceptable ranges to proceed with 

epidural if desired.





- Allergies


Allergies/Adverse Reactions: 


                                    Allergies











Allergy/AdvReac Type Severity Reaction Status Date / Time


 


No Known Allergies Allergy   Verified 11/22/21 03:02














- Anesthesia Plan


Pre-Op Medication Ordered: None





- Acknowledgements


Anesthesia Type Planned: Epidural


Pt an Appropriate Candidate for the Planned Anesthesia: Yes


Alternatives and Risks of Anesthesia Discussed w Pt/Guardian: Yes


Pt/Guardian Understands and Agrees with Anesthesia Plan: Yes





PreAnesthesia Questionnaire


HEENT History: Reports: None


Cardiovascular History: Reports: None


Respiratory History: Reports: None


Gastrointestinal History: Reports: Other (See Below)


Other Gastrointestinal History: descending colon adenocarcinoma


Genitourinary History: Reports: None


OB/GYN History: Reports: Pregnancy


Musculoskeletal History: Reports: None


Neurological History: Reports: None


Psychiatric History: Reports: None


Endocrine/Metabolic History: Reports: Hypothyroidism


Hematologic History: Reports: None


Immunologic History: Reports: None


Oncologic (Cancer) History: Reports: Colon


Dermatologic History: Reports: None





- Past Surgical History


GI Surgical History: Reports: Colonoscopy, Colostomy


Neurological Surgical History: Reports: None


Musculoskeletal Surgical History: Reports: None


Other Oncologic Surgeries/Procedures: laparatomy, coloectmy with colostomy





- SUBSTANCE USE


Tobacco Use Status *Q: Never Tobacco User


Second Hand Smoke Exposure: No


Recreational Drug Use History: No





- HOME MEDS


Home Medications: 


                                    Home Meds





Levothyroxine Sodium [Levothyroxine] 50 mcg PO DAILY 07/26/21 [History]


Prenatal No122/Iron/Folic Acid [Prenatal Multi Tablet] 1 each PO DAILY 07/26/21 

[History]











- CURRENT (IN HOUSE) MEDS


Current Meds: 





                               Current Medications





Diphenhydramine HCl (Diphenhydramine 50 Mg/Ml Sdv)  25 mg IVPUSH Q6H PRN


   PRN Reason: pruritis


Ephedrine Sulfate (Ephedrine 50 Mg/Ml Sdv)  5 mg IVPUSH ASDIRECTED PRN


   PRN Reason: Hypotension


Fentanyl (Fentanyl 100 Mcg/2 Ml Sdv)  100 mcg EPIDUR Q3H PRN


   PRN Reason: Pain


Fentanyl/Bupivacaine HCl (Bupivacaine/Fentanyl/Ns 100 Ml Bag)  100 ml EPIDUR 

ASDIRECTED COURTNEY


Oxytocin/Lactated Ringer's (Pitocin In Lr 10 Units/1,000 Ml)  10 unit in 1,000 

mls @ 12 mls/hr IV TITRATE COURTNEY; Protocol


   Last Titration: 11/22/21 06:10 Dose:  10 munits/min, 60 mls/hr


   Documented by: 


Oxytocin/Lactated Ringer's (Pitocin In Lr 10 Units/1,000 Ml)  10 unit in 1,000 

mls @ 500 mls/hr IV .CONTINUOUS COURTNEY


Lactated Ringer's (Ringers, Lactated)  1,000 mls @ 100 mls/hr IV ASDIRECTED COURTNEY


   Last Admin: 11/22/21 04:05 Dose:  100 mls/hr


   Documented by: 


Miscellaneous Medication (Phenylephrine Hcl In 0.9% Nacl 1 Mg/10 Ml Syringe)  

0.1 mg IVPUSH Q10M PRN


   PRN Reason: Hypotension


Nalbuphine HCl (Nalbuphine 10 Mg/1 Ml Vial)  10 mg IVPUSH Q2H PRN


   PRN Reason: Pain


Ondansetron HCl (Ondansetron 4 Mg/2 Ml Sdv)  4 mg IVPUSH Q4H PRN


   PRN Reason: Nausea/Vomiting


Ondansetron HCl (Ondansetron 4 Mg/2 Ml Sdv)  4 mg IVPUSH ONETIME PRN


   PRN Reason: Nausea/Vomiting


Sodium Chloride (Sodium Chloride 0.9% 10 Ml Syringe)  10 ml FLUSH ASDIRECTED PRN


   PRN Reason: Keep Vein Open

## 2021-11-22 NOTE — PCM.LDHP
L&D History of Present Illness





- General


Date of Service: 21


Admit Problem/Dx: 


                   Patient Status Order with Admit Dx/Problem





21 03:02


Patient Status [ADT] Routine 








                           Admission Diagnosis/Problem











Admission Diagnosis/Problem    Pregnancy














Source of Information: Patient


History Limitations: Reports: No Limitations





- History of Present Illness


Introduction:: 





Esther is a 28 year old  1 female admitted for induction of labor on 

21 at 39 weeks gestational age with an KEVIN of 21 based on early 

ultrasound on 21. Induction started at 0300 hrs, and prior to this she 

denies any regular contractions, cramping, bleeding, discharge. Upon 

examination, she feels well, does not feel the contractions. Notably, she was 

diagnosed with stage 2 adenocarcinoma of the colon during this pregnancy and 

currently has a colostomy in place. She has been following Dr. Blanco for 

this. She also has hypothyroidism, which has been monitored throughout her 

pregnancy.





OB/GYN history: . Menarche was age 12. Had irregular menses prior to this 

pregnancy, with cycles every 35-40 days; she was not on contraception. LMP was 

3/7/21, which gave her an KEVIN of 21, however, her ultrasound on 21 

gave an KEVIN of 21. 





Past obstetrics history:


1. Current pregnancy, 39 weeks





Prenatal course: Initially seen on 21, and has been regularly seen by Dr. Watts for prenatal care. Vitals, fundal height, fetal heart rate, and weight 

gain (~24 lbs) throughout pregnancy have been appropriate. Thyroid hormones have

 been within normal limits.





Blood type: B+


Antibody screen: negative 


Urine culture: negative


Rubella status: immune


Hepatitis B surface antigen: negative


RPR: nonreactive


Hepatitis C: negative


HIV: negative


Gonorrhea: negative


Chlamydia: negative


Genetic testing: declined


Anatomy ultrasound: 7/15/21, 21


One hour glucose tolerance test: 65


Hemoglobin: 12.6 --> 11.5


Platelets: 234 --> 127


GBS Status: negative





Allergies: 


1. No known allergies





Medications: 


1. Iron 235 mg daily


2. UQORA supplement daily


3. Prenatal vitamine daily


4. Levothyroxine 50 mcg daily





Past medical history: 


1.  Hypothyroidism


2. Colon cancer, s/p colectomy on 21





Past surgical history: 


1. Laparotomy, colectomy, and colostomy placement 21





Family history: 1 brother without medical problems. Mother with a history of 

thyroid cancer. Father has atrial fibrillation. Maternal grandmother has a 

pacemaker and grandfather has no known problems. Paternal grandmother with 

hypothyroidism and history of skin cancer; grandfather with history of prostate 

cancer. No known problems affecting pregnancy or blood disorders in other 

extended family members.





Social history: Significant other is Kali. They live in [place]. Patient denies 

alcohol, drug, and tobacco use during pregnancy.





Review of systems: Positive for a very mild headache, otherwise a comprehensive 

ROS is negative, including HEENT complaints, cough, shortness of breath, chest 

pain, extremity swelling, vision changes, abdominal pain, dysuria.





Physical Exam: 


General: well-developed, well-nourished, pleasant, gravid female 


Skin: warm, dry, no lesions


HEENT, neck, and back within normal limits


Lungs: clear to auscultation bilaterally


CV: regular rate and rhythm without murmurs


Abdomen: gravid


Genital digital exam: 1-2 cm dilated, 50% effaced, soft, mid-position, -3 

station, cephalic presentation


Extremities: no edema


Neuro: no focal deficits noted, 2+ biceps reflexes bilaterally, 3+ patellar 

reflexes bilaterally, no clonus








- Related Data


Allergies/Adverse Reactions: 


                                    Allergies











Allergy/AdvReac Type Severity Reaction Status Date / Time


 


No Known Allergies Allergy   Verified 21 03:02











Home Medications: 


                                    Home Meds





Levothyroxine Sodium [Levothyroxine] 50 mcg PO DAILY 21 [History]


Prenatal No122/Iron/Folic Acid [Prenatal Multi Tablet] 1 each PO DAILY 21 

[History]











Past Medical History


HEENT History: Reports: None


Cardiovascular History: Reports: None


Respiratory History: Reports: None


Gastrointestinal History: Reports: Other (See Below)


Other Gastrointestinal History: descending colon adenocarcinoma


Genitourinary History: Reports: None


OB/GYN History: Reports: Pregnancy


Musculoskeletal History: Reports: None


Neurological History: Reports: None


Psychiatric History: Reports: None


Endocrine/Metabolic History: Reports: Hypothyroidism


Hematologic History: Reports: None


Immunologic History: Reports: None


Oncologic (Cancer) History: Reports: Colon


Dermatologic History: Reports: None





- Past Surgical History


GI Surgical History: Reports: Colonoscopy, Colostomy


Neurological Surgical History: Reports: None


Musculoskeletal Surgical History: Reports: None


Other Oncologic Surgeries/Procedures: laparatomy, coloectmy with colostomy





Social & Family History





- Family History


Family Medical History: No Pertinent Family History





- Tobacco Use


Tobacco Use Status *Q: Never Tobacco User


Second Hand Smoke Exposure: No





- Caffeine Use


Caffeine Use: Reports: None





- Recreational Drug Use


Recreational Drug Use: No





H&P Review of Systems





- Review of Systems:


Review Of Systems: See Below





L&D Exam





- Exam


Exam: See Below





- Vital Signs


Vital Signs: 


                                Last Vital Signs











Temp  97.9 F   21 03:10


 


Pulse  73   21 03:10


 


Resp  16   21 03:10


 


BP  150/95 H  21 03:10


 


Pulse Ox  98   21 03:10











Weight: 146 lb





- Patient Data


Lab Results Last 24 hrs: 


                         Laboratory Results - last 24 hr











  21 Range/Units





  03:20 03:20 03:20 


 


WBC  6.96    (3.98-10.04)  K/mm3


 


RBC  4.70    (3.98-5.22)  M/mm3


 


Hgb  14.0  D    (11.2-15.7)  gm/dl


 


Hct  40.5    (34.1-44.9)  %


 


MCV  86.2    (79.4-94.8)  fl


 


MCH  29.8    (25.6-32.2)  pg


 


MCHC  34.6    (32.2-35.5)  g/dl


 


RDW Std Deviation  44.0    (36.4-46.3)  fL


 


Plt Count  114 L D    (182-369)  K/mm3


 


MPV  TNP    


 


Neut % (Auto)  71.9 H    (34.0-71.1)  %


 


Lymph % (Auto)  16.7 L    (19.3-51.7)  %


 


Mono % (Auto)  9.2    (4.7-12.5)  %


 


Eos % (Auto)  1.3    (0.7-5.8)  


 


Baso % (Auto)  0.3    (0.1-1.2)  %


 


Neut # (Auto)  5.01    (1.56-6.13)  K/mm3


 


Lymph # (Auto)  1.16 L    (1.18-3.74)  K/mm3


 


Mono # (Auto)  0.64 H    (0.24-0.36)  K/mm3


 


Eos # (Auto)  0.09    (0.04-0.36)  K/mm3


 


Baso # (Auto)  0.02    (0.01-0.08)  K/mm3


 


Manual Slide Review  Abnormal smear    


 


PT     (9.7-12.0)  SECONDS


 


INR     


 


BUN    13  (7-18)  mg/dL


 


Creatinine    0.8  (0.55-1.02)  mg/dL


 


Est Cr Clr Drug Dosing    86.60  mL/min


 


Estimated GFR (MDRD)    > 60  (>60)  mL/min


 


Uric Acid    6.0  (2.6-6.0)  mg/dL


 


AST    24  (15-37)  U/L


 


ALT    17  (14-59)  U/L


 


Lactate Dehydrogenase    163  ()  U/L


 


Ur Random Creatinine     (30.0-125.0)  mg/dL


 


U Random Total Protein     (0.0-11.8)  mg/dL


 


Protein/Creatinin Ratio     


 


SARS-CoV-2 RNA (LUL)     (NEGATIVE)  


 


Blood Type   B POSITIVE   


 


Gel Antibody Screen   Negative   














  21 Range/Units





  03:20 04:00 05:25 


 


WBC     (3.98-10.04)  K/mm3


 


RBC     (3.98-5.22)  M/mm3


 


Hgb     (11.2-15.7)  gm/dl


 


Hct     (34.1-44.9)  %


 


MCV     (79.4-94.8)  fl


 


MCH     (25.6-32.2)  pg


 


MCHC     (32.2-35.5)  g/dl


 


RDW Std Deviation     (36.4-46.3)  fL


 


Plt Count     (182-369)  K/mm3


 


MPV     


 


Neut % (Auto)     (34.0-71.1)  %


 


Lymph % (Auto)     (19.3-51.7)  %


 


Mono % (Auto)     (4.7-12.5)  %


 


Eos % (Auto)     (0.7-5.8)  


 


Baso % (Auto)     (0.1-1.2)  %


 


Neut # (Auto)     (1.56-6.13)  K/mm3


 


Lymph # (Auto)     (1.18-3.74)  K/mm3


 


Mono # (Auto)     (0.24-0.36)  K/mm3


 


Eos # (Auto)     (0.04-0.36)  K/mm3


 


Baso # (Auto)     (0.01-0.08)  K/mm3


 


Manual Slide Review     


 


PT  9.2 L    (9.7-12.0)  SECONDS


 


INR  < 0.93    


 


BUN     (7-18)  mg/dL


 


Creatinine     (0.55-1.02)  mg/dL


 


Est Cr Clr Drug Dosing     mL/min


 


Estimated GFR (MDRD)     (>60)  mL/min


 


Uric Acid     (2.6-6.0)  mg/dL


 


AST     (15-37)  U/L


 


ALT     (14-59)  U/L


 


Lactate Dehydrogenase     ()  U/L


 


Ur Random Creatinine    24.1 L  (30.0-125.0)  mg/dL


 


U Random Total Protein    < 6.0  (0.0-11.8)  mg/dL


 


Protein/Creatinin Ratio    TNP  


 


SARS-CoV-2 RNA (LUL)   Negative   (NEGATIVE)  


 


Blood Type     


 


Gel Antibody Screen     











Result Diagrams: 


                                 21 03:20





                                 21 03:20





- Problem List


(1) 39 weeks gestation of pregnancy


SNOMED Code(s): 74626018


   ICD Code: Z3A.39 - 39 WEEKS GESTATION OF PREGNANCY   Status: Acute   Current 

Visit: Yes   





(2) Colostomy in place


SNOMED Code(s): 357927649, 097581513


   ICD Code: Z93.3 - COLOSTOMY STATUS   Status: Acute   Current Visit: Yes   





(3) History of colon cancer


SNOMED Code(s): 638392952


   ICD Code: Z85.038 - PERSONAL HISTORY OF MALIGNANT NEOPLASM OF LARGE INTESTINE

   Status: Acute   Current Visit: Yes   





(4) Hypothyroidism


SNOMED Code(s): 69820870


   ICD Code: E03.9 - HYPOTHYROIDISM, UNSPECIFIED   Status: Acute   Current 

Visit: Yes   


Problem List Initiated/Reviewed/Updated: Yes


Orders Last 24hrs: 


                               Active Orders 24 hr











 Category Date Time Status


 


 Patient Status [ADT] Routine ADT  21 03:02 Active


 


 Activity as Tolerated [RC] PFP Care  21 03:02 Active


 


 Communication Order [RC] ASDIRECTED Care  21 03:02 Active


 


 Notify Provider [RC] ASDIRECTED Care  21 06:32 Active


 


 Notify Provider [RC] PFP Care  21 03:02 Active


 


 Notify Provider [RC] PRN Care  21 03:02 Active


 


 Peripheral IV Care [RC] .AS DIRECTED Care  21 03:03 Active


 


 RAPID PLASMA REAGIN,RPR [CHEM] Routine Lab  21 03:20 Received


 


 Bupivacaine/fentaNYL/NS [fentaNYL/Bupivacaine/NS 2 MCG- Med  21 06:45 

Active





 0.125% 100 ML]   





 100 ml EPIDUR ASDIRECTED   


 


 Lactated Ringers [Ringers, Lactated] 1,000 ml Med  21 03:15 Active





 IV ASDIRECTED   


 


 Nalbuphine [Nubain] Med  21 03:02 Active





 10 mg IVPUSH Q2H PRN   


 


 Ondansetron [Zofran] Med  21 06:32 Active





 4 mg IVPUSH ONETIME PRN   


 


 Ondansetron [Zofran] Med  21 03:02 Active





 4 mg IVPUSH Q4H PRN   


 


 Oxytocin/Lactated Ringers [Pitocin in LR 10 Units/1,000 Med  21 03:15 

Active





 ML]   





 10 unit in 1,000 ml IV .CONTINUOUS   


 


 Oxytocin/Lactated Ringers [Pitocin in LR 10 Units/1,000 Med  21 03:15 

Active





 ML]   





 10 unit in 1,000 ml IV TITRATE   


 


 Phenylephrine HCl In 0.9% NaCl [Phenylephrine 1 MG/10 Med  21 06:32 

Active





 ML-NS]   





 0.1 mg IVPUSH Q10M PRN   


 


 Sodium Chloride 0.9% [Saline Flush] Med  21 03:02 Active





 10 ml FLUSH ASDIRECTED PRN   


 


 diphenhydrAMINE [Benadryl] Med  21 06:32 Active





 25 mg IVPUSH Q6H PRN   


 


 ePHEDrine [ePHEDrine sulfate] Med  21 06:32 Active





 5 mg IVPUSH ASDIRECTED PRN   


 


 fentaNYL [Sublimaze] Med  21 06:32 Active





 100 mcg EPIDUR Q3H PRN   


 


 Electronic Fetal Heart Tones Ext w TOCO [WOMSER] Oth  21 03:02 Ordered





 Routine   


 


 Electronic Fetal Heart Tones Internal [WOMSER] Per Unit Oth  21 03:02 

Ordered





 Routine   


 


 PIH Panel [OM.PC] Stat Oth  21 05:11 Ordered


 


 Peripheral IV Insertion Adult [OM.PC] Routine Oth  21 03:02 Ordered


 


 Resuscitation Status Routine Resus Stat  21 03:02 Ordered








                                Medication Orders





Diphenhydramine HCl (Diphenhydramine 50 Mg/Ml Sdv)  25 mg IVPUSH Q6H PRN


   PRN Reason: pruritis


Ephedrine Sulfate (Ephedrine 50 Mg/Ml Sdv)  5 mg IVPUSH ASDIRECTED PRN


   PRN Reason: Hypotension


Fentanyl (Fentanyl 100 Mcg/2 Ml Sdv)  100 mcg EPIDUR Q3H PRN


   PRN Reason: Pain


Fentanyl/Bupivacaine HCl (Bupivacaine/Fentanyl/Ns 100 Ml Bag)  100 ml EPIDUR 

ASDIRECTED COURTNEY


Oxytocin/Lactated Ringer's (Pitocin In Lr 10 Units/1,000 Ml)  10 unit in 1,000 

mls @ 12 mls/hr IV TITRATE COURTNEY; Protocol


   Last Titration: 21 08:34  Dose: 16 munits/min, 96 mls/hr


   Documented by: PETECHE


   Titration: 21 08:03  Dose: 14 munits/min, 84 mls/hr


   Documented by: PETECHE


   Titration: 21 07:38  Dose: 12 munits/min, 72 mls/hr


   Documented by: PETECHE


   Titration: 21 06:10  Dose: 10 munits/min, 60 mls/hr


   Documented by: DECKAMB


   Titration: 21 05:49  Dose: 8 munits/min, 48 mls/hr


   Documented by: DECKAMB


   Titration: 21 05:25  Dose: 6 munits/min, 36 mls/hr


   Documented by: DECKAMB


   Titration: 21 04:55  Dose: 4 munits/min, 24 mls/hr


   Documented by: DECKAMB


   Admin: 21 04:05  Dose: 2 munits/min, 12 mls/hr


   Documented by: DECKAMB


Oxytocin/Lactated Ringer's (Pitocin In Lr 10 Units/1,000 Ml)  10 unit in 1,000 

mls @ 500 mls/hr IV .CONTINUOUS COURTNEY


Lactated Ringer's (Ringers, Lactated)  1,000 mls @ 100 mls/hr IV ASDIRECTED COURTNEY


   Last Admin: 21 04:05  Dose: 100 mls/hr


   Documented by: BLAYNE


Miscellaneous Medication (Phenylephrine Hcl In 0.9% Nacl 1 Mg/10 Ml Syringe)  

0.1 mg IVPUSH Q10M PRN


   PRN Reason: Hypotension


Nalbuphine HCl (Nalbuphine 10 Mg/1 Ml Vial)  10 mg IVPUSH Q2H PRN


   PRN Reason: Pain


Ondansetron HCl (Ondansetron 4 Mg/2 Ml Sdv)  4 mg IVPUSH Q4H PRN


   PRN Reason: Nausea/Vomiting


Ondansetron HCl (Ondansetron 4 Mg/2 Ml Sdv)  4 mg IVPUSH ONETIME PRN


   PRN Reason: Nausea/Vomiting


Sodium Chloride (Sodium Chloride 0.9% 10 Ml Syringe)  10 ml FLUSH ASDIRECTED PRN


   PRN Reason: Keep Vein Open








Assessment/Plan Comment:: 





Assessment:


1Alejandra Santana is a 28 year old  1 female admitted for induction of labor on 

21 at 39 weeks gestational age with an KEVIN of 21 based on early 

ultrasound on 21.





2. Blood pressures have been elevated during admission; physical exam revealed 

3+ patellar reflexes, but not swelling; labs show increased hemoglobin, 

decreased platelets, increased uric acid, normal liver and kidney function, no 

proteinuria





3. Pregnancy complicated by history of colon cancer in pregnancy s/p colectomy 

and colostomy currently in place





4. History of hypothyroidism





5. Open to epidural for labor analgesia





6. Plans to breastfeed





Plan:


1. Admit for induction of labor at 39 weeks. Near continuous monitoring of fetal

 heart tones. Routine labor care. Labor augmentation as needed.





2. Monitor vitals and labs closely for signs of pre-eclampsia.





3. Consult to Dr. Blanco for guidance regarding colostomy during labor. He 

said to call him when she begins pushing and recommended holding pressure during

 this part of labor.





4. Pain management as needed. Activity as tolerated.





Connie Bhandari, [facility]

## 2021-11-22 NOTE — PCM.SN.2
- Free Text/Narrative


Note: 





General Surgery Brief Consult Note





Esther Corea is a 29 yo woman who underwent emergent laparotomy for large bowel 

obstruction last Summer, found to have pT3N0 adenocarcinoma near the junction of

the descending and sigmoid colon. She was about 20 weeks pregnant at that time. 

She has an end transverse colostomy. Subsequent colonoscopy showed no evidence 

of synchronous lesions or polyposis. She has been struggling with colostomy 

prolapse for several weeks, and is now admitted for scheduled induction of 

labor. 





Regarding concern for prolapse during delivery, so long as direct pressure is 

held at the fascial defect during Valsalva I do not anticipate any problems. I 

will be available to help in this regard.

## 2021-11-23 NOTE — PCM.POSTAN
POST ANESTHESIA ASSESSMENT





- MENTAL STATUS


Mental Status: Alert





- VITAL SIGNS


Vital Signs: 


                                Last Vital Signs











Temp  37.4 C   11/23/21 07:40


 


Pulse  100  11/22/21 07:40


 


Resp  18   11/23/21 07:40


 


BP  143/75 H  11/23/21 07:40


 


Pulse Ox  96   11/23/21 07:40














- RESPIRATORY


Respiratory Status: Respiratory Rate WNL, Airway Patent, O2 Saturation Stable, 

Supplemental Oxygen





- CARDIOVASCULAR


CV Status: Pulse Rate WNL, Blood Pressure Stable





- GASTROINTESTINAL


GI Status: No Symptoms





- POST OP HYDRATION


Hydration Status: Adequate & Stable

## 2021-11-23 NOTE — PCM.SN.2
- Free Text/Narrative


Note: 





Patient was restarted on Pitocin but with increase in frequency of contractions 

fetal heart tones were somewhat compromised.  Late decelerations and bradycardia

ensued.  Pitocin was discontinued.  Baby is made good resuscitation in utero and

fetal heart tones are reasonable at this time.  Decision was made to proceed to 

 section.  Procedure, risk, benefits, alternatives of care discussed in 

detail with patient and her significant other.  They have.  Understand, wish to 

proceed and signed a consent.  Ancef 2 g plus azithromycin per protocol are 

given prior to the procedure.  Dr. Blanco, general surgery, will assist with 

surgery.

## 2021-11-23 NOTE — PCM.OPNOTE
- General Post-Op/Procedure Note


Date of Surgery/Procedure: 21


Operative Procedure(s): Primary low uterine segment transverse  section 

through Pfannenstiel skin incision


Findings: 





Uterus found to be term size.  Amniotic fluid is clear.  Lower uterine segment 

was well-developed.  Baby in vertex presentation with a left occiput posterior 

positioning.  Umbilical cord loosely around the neck x1.  Three-vessel cord.  

Baby in vertex presentation.  Cervix adequately dilated to long egress of fluid.

 Female infant delivered at 0709 hrs. on 3/2021.  6 pound 5 ounce baby.  Apgars 

of 9 and 9.


Pre Op Diagnosis: 1.  39-week intrauterine pregnancy.  2.  Fetal intolerance of 

labor.  3.  History of colon cancer diagnosed in pregnancystatus post resection

of tumor with placement of a colostomy


Post-Op Diagnosis: Same with delivery of viable, 6 pound 5 ounce (2870 g) female

infant named Caroline Diaz with Apgars of 9 and 9 at 0709 hrs. on 2021


Anesthesia Technique: Epidural


Other Anesthesia Type: Marcaine 0.5% 10 mLlocal


Primary Surgeon: Jonnie Watts


Secondary Surgeon: John Blanco


Anesthesia Provider: Lois Saez


Reason Assistant Was Necessary: 





Retraction, assistance, patient safety, quality of care.


Fluid Replacement, Intraop: 950


Output, Urine Amount: 125


EBL in mLs: 600


Drain/Tube Comments:: Indwelling bladder catheter


Complications: None


Condition: Good


Free Text/Narrative:: 


                                 Intake & Output











 21





 22:59 06:59 14:59


 


Intake Total 0  


 


Output Total  1750 


 


Balance 0 -1750 








Surgery duration: 26 minutes





Procedure:  The patient is appropriately consented. Patient was transferred to 

the  room and placed in a supine position with a wedge under her right 

side.  Epidural catheter had previously been placed for labor analgesia and was 

bolstered to anesthetic effect.  Patient has a colostomy on her left side.  The 

bag was removed and area was prepped gently with Betadine and covered with Telfa

and Tegaderm.  The patient was otherwise prepped and draped in usual fashion as 

the Neville catheter was already placed . The anesthetic was checked and found to 

be adequate. 20 mL of Marcaine 0.5% was injected locally in the Pfannenstiel 

incision site. The Pfannenstiel skin incision was then made and carried down 

through skin, subcutaneous and fascial layers. The fascia was then undermined 

superiorly and inferiorly to allow for adequate operating room. The recti 

muscles  midline and preperitoneal fat was bluntly dissected. Perit

olivo cavity was entered longitudinally. The vesicouterine peritoneum was then 

incised transversely and bladder flap was developed. Myometrium was incised 

transversely to the level of the amniotic sac. This incision was extended 

bilaterally in a blunt fashion. The amniotic sac was then ruptured resulting in 

clear amniotic fluid. A hand is placed in the low uterine segment and the baby's

head was brought forth through the incision. The baby was completely delivered 

using fundal pressure in a routine fashion. The nose and mouth were bulb 

suctioned. Baby's cord was clamped x2 cut and baby was handed off to attending 

pediatrician Dr Arango. Placenta was expressed after cord blood was obtained. 

Uterus was then exteriorized to allow for easier closure. The cervix was 

assessed and found to be dilated adequately to allow egress of blood. The uterus

was closed in 2 layers. The first layer a running locked suture of 0 Monocryl, 

the second layer a running locked vertical mattress suture of 0 Monocryl.  

Sponge instrument needle counts are correct. The uterus was returned to the 

abdominal cavity and lateral gutters were cleared of blood. Once again sponge 

needle counts are correct. The anterior abdominal wall was closed with a #1 PDS 

suture from angle to angle. The subcutaneous area was found to be free of any 

bleeders. interrupted sutures of 3-0 Monocryl were used to reapproximate the 

subcutaneous layer.Skin was closed with a running subcuticular stitch of 3-0 

Monocryl in a vertical mattress suture fashion using a Jamie needle. Prineo 

mesh/glue was then applied to further approximate the incision.





It should be noted that patient received 2 g of Ancef earlier because of 

elevated temperature and received a 1 g dose just prior to procedure along with 

ice and per protocol preoperatively for infection prophylaxis and had Pitocin 

infused after delivery of the placenta to facilitate uterine contraction. She 

also had sequential compression stockings in place for DVT prophylaxis. Patient 

was discharged from the operating room in satisfactory condition.

## 2021-11-23 NOTE — PCM.SN.2
- Free Text/Narrative


Note: 





Progress note:





Esther has made slow progress to 5 cm, 90 percent effacement, +1 station, 

anterior.  Some molding is noted.  Fetal heart tones show some mild decreased 

variability occasional decelerations with somewhat late return to baseline.  

Variability is reasonable at this time.  Internal pressure catheter was placed 

approximately 4 hours ago and shows contraction pattern that is less than 

optimal with a frequency of approximately 4 minutes and contractions mild in 

intensity.  Pitocin has been turned off.  Patient was started on 

[id number] mL of normal saline through the IUPC initially followed by 100 

cc/h thereafter on pump.  Her temperature is increased to just above 100.  

Patient feels warm by report.





Plan: Will restart Pitocin and attempt to achieve adequate labor pattern.  We 

will monitor fetal heart tones closely.  We will intervene as indicated by fetal

heart rate tracing.  Ancef 2 g IV will be given at this time.  Discussion was 

held with patient and significant other as to the plan and the possibility of 

intervention with  if necessary.  They appear to have no questions.

## 2021-11-24 NOTE — [id number]
Post Anesthesia Note





- EVALUATION WITHIN 48HRS OF ANESTHETIC


Vital Signs in Normal Range: Yes


Patient Participated in Evaluation: Yes


Respiratory Function Stable: Yes


Airway Patent: Yes


Cardiovascular Function Stable: Yes


Hydration Status Stable: Yes


Pain Control Satisfactory: Yes


Nausea and Vomiting Control Satisfactory: Yes


Mental Status Recovered: Yes


Vital Signs: 


                                Last Vital Signs











Temp  37.3 C   11/24/21 00:15


 


Pulse  75   11/24/21 00:15


 


Resp  15   11/24/21 06:57


 


BP  133/75   11/24/21 00:15


 


Pulse Ox  98   11/24/21 06:57

## 2021-11-25 NOTE — PCM.DCSUM1
**Discharge Summary





- Hospital Course


Free Text/Narrative:: 





Esther Corea is a 28-year-old  1 now para 1-0-0-1 female was admitted on 

the a.m. of 2021 for induction of labor.  Patient has a history of 

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 

partial colectomy and establishment of colostomy in approximately 22 weeks 

gestational age.  She underwent induction of labor with Pitocin initially then 

AROM with resultant clear fluid.  She progressed to approximately 5 cm very s

lowly and then the baby appeared to not tolerate labor with decelerations and 

short bradycardias.  Decision was made to proceed with  section.  Please

see preoperative notes.





Esther underwent a primary low uterine segment transverse  section 

through Pfannenstiel skin incision under an epidural block on 2021.  

Uterus is found to be term size, amniotic fluid is clear.  Uterine segment is 

well-developed.  Baby is in vertex presentation with left occiput posterior 

positioning.  Umbilical cord was loosely around the neck x1.  There was a three-

vessel cord.  Baby is in vertex presentation.  Cervix was adequately dilated to 

allow for egress of fluid at that time.  She delivered a female infant at 0709 

hrs. on 2021.  Baby weighed 6 pounds 5 ounces and had Apgars of 9 and 9.





Diagnosis was


1.  39-week intrauterine pregnancy.  


2.  Fetal intolerance of labor.  


3.  History of colon cancer diagnosed in pregnancystatus post resection of 

tumor with placement of a colostomy








Postop patient has done very well.  She is nursing without problems.  She is 

made good bowel, bladder, ambulatory recovery.  Her lochia is minimal.  She is 

voiding without concerns and ambulating well.  She is desiring discharge home.  

Follow-up CBC was within normal limits for postoperative.  Vital signs have been

stable and patient has been afebrile.  She has had no significant concerns re

lated to her colostomy site.


Diagnosis: Stroke: No





- Discharge Data


Discharge Date: 21


Discharge Disposition: Home, Self-Care 01


Condition: Good





- Referral to Home Health


Primary Care Physician: 


Jonnie Watts MD








- Patient Summary/Data


Operative Procedure(s) Performed: Primary low uterine segment transverse 

 section through Pfannenstiel skin incision





- Patient Instructions


Diet: Regular Diet as Tolerated (Nursing diet with increased calories and 

calcium is recommended)


Activity: As Tolerated (No intercourse or tampons until bleeding resolves)


Driving: Do Not Drive


Showering/Bathing: May Shower


Wound/Incision Care: Keep Operative Site/Wound Site Clean and Dry


Notify Provider of: Fever, Increased Pain, Swelling and Redness, Nausea and/or 

Vomiting





- Discharge Plan


Home Medications: 


                                    Home Meds





Prenatal No122/Iron/Folic Acid [Prenatal Multi Tablet] 1 each PO DAILY 21 

[History]


Acetaminophen/oxyCODONE [Percocet 325-5 MG] 2 tab PO Q4H PRN  tablet 21 

[Rx]


Ibuprofen [Motrin] 600 mg PO Q6H  tablet 21 [Rx]


Levothyroxine [Synthroid] 50 mcg PO DAILY  tablet 21 [Rx]








Referrals: 


Jonnie Watts MD [Primary Care Provider] -  (Return to clinicDr. Watts2 

weeks.)





- Discharge Summary/Plan Comment


DC Time >30 min.: No


Total # of Minutes for Discharge Time: 





10


Discharge Summary/Plan Comment: 








Discharge instructions:





1. Discharge home





2. Diet, activity and follow-up discussed with patient. Recommend nursing diet 

with increased calories and calcium.





3. Precautions given concern increased pain, bleeding, temperature, 

signs/symptoms of DVT/PE.





4. Medications per home medication was printed, discussed with and given to the 

patient.





5. Return to clinic-Dr. Watts-[facility], [facility].





Diagnosis: 





1.  Term pregnancy-delivered





2.  Fetal intolerance of labor





3.  History of colorectal cancer with partial colectomy and establishment of 

colostomy during pregnancy.





Condition: Good





- Patient Data


Vitals - Most Recent: 


                                Last Vital Signs











Temp  36.9 C   21 04:00


 


Pulse  72   21 04:00


 


Resp  15   21 04:00


 


BP  117/93 H  21 04:00


 


Pulse Ox  99   21 04:00











Weight - Most Recent: 66.224 kg


I&O - Last 24 hours: 


                                 Intake & Output











 21





 14:59 22:59 06:59


 


Intake Total 500 275 


 


Output Total 750 250 


 


Balance -250 25 











Lab Results - Last 24 hrs: 


                         Laboratory Results - last 24 hr











  21 Range/Units





  06:52 


 


WBC  7.20  (3.98-10.04)  K/mm3


 


RBC  3.84 L  (3.98-5.22)  M/mm3


 


Hgb  11.3  D  (11.2-15.7)  gm/dl


 


Hct  34.5  (34.1-44.9)  %


 


MCV  89.8  D  (79.4-94.8)  fl


 


MCH  29.4  (25.6-32.2)  pg


 


MCHC  32.8  (32.2-35.5)  g/dl


 


RDW Std Deviation  45.2  (36.4-46.3)  fL


 


Plt Count  103 L  (182-369)  K/mm3


 


MPV  12.0  (9.4-12.3)  fl


 


Neut % (Auto)  71.6 H  (34.0-71.1)  %


 


Lymph % (Auto)  16.7 L  (19.3-51.7)  %


 


Mono % (Auto)  9.4  (4.7-12.5)  %


 


Eos % (Auto)  1.9  (0.7-5.8)  


 


Baso % (Auto)  0.1  (0.1-1.2)  %


 


Neut # (Auto)  5.15  (1.56-6.13)  K/mm3


 


Lymph # (Auto)  1.20  (1.18-3.74)  K/mm3


 


Mono # (Auto)  0.68 H  (0.24-0.36)  K/mm3


 


Eos # (Auto)  0.14  (0.04-0.36)  K/mm3


 


Baso # (Auto)  0.01  (0.01-0.08)  K/mm3











Med Orders - Current: 


                               Current Medications





Diphenhydramine HCl (Diphenhydramine 50 Mg/Ml Sdv)  25 mg IVPUSH Q6H PRN


   PRN Reason: Itching or Nausea


Ephedrine Sulfate (Ephedrine 50 Mg/Ml Sdv)  5 mg IVPUSH SEECOMMENT PRN


   PRN Reason: Other


Ibuprofen (Ibuprofen 600 Mg Tab)  600 mg PO Q6H Counts include 234 beds at the [facility]


   Last Admin: 21 03:03 Dose:  600 mg


   Documented by: 


Levothyroxine Sodium (Levothyroxine 50 Mcg Tab)  50 mcg PO DAILY Counts include 234 beds at the [facility]


   Last Admin: 21 09:30 Dose:  50 mcg


   Documented by: 


Naloxone HCl (Naloxone 0.4 Mg/Ml Sdv)  0.1 mg IVPUSH SEECOMMENT PRN


   PRN Reason: Respiratory Depression


Oxycodone/Acetaminophen (Acetaminophen/Oxycodone 325-5 Mg Tab)  1 tab PO Q4H PRN


   PRN Reason: Pain (moderate 4-6)


Oxycodone/Acetaminophen (Acetaminophen/Oxycodone 325-5 Mg Tab)  2 tab PO Q4H PRN


   PRN Reason: Pain (severe 7-10)





Discontinued Medications





Azithromycin (Azithromycin 500 Mg Vial) Confirm Administered Dose 500 mg .ROUTE 

.STK-MED ONE


   Stop: 21 06:25


   Last Admin: 21 10:23 Dose:  Not Given


   Documented by: 


Azithromycin (Azithromycin 500 Mg Advvial) Confirm Administered Dose 500 mg 

.ROUTE .STK-MED ONE


   Stop: 21 06:29


   Last Admin: 21 10:23 Dose:  Not Given


   Documented by: 


Bupivacaine HCl (Bupivacaine 0.5% 30 Ml Sdv) Confirm Administered Dose 30 ml 

.ROUTE .STK-MED ONE


   Stop: 21 06:45


   Last Admin: 21 07:06 Dose:  20 ml


   Documented by: 


Cefazolin Sodium (Cefazolin 1 Gm Vial) Confirm Administered Dose 2 gm .ROUTE 

.STK-MED ONE


   Stop: 21 06:27


Citric Acid/Sodium Citrate (Citric Acid/Sodium Citrate Solution 30 Ml Cup)  30 

ml PO ONETIME ONE


   Stop: 21 06:29


   Last Admin: 21 06:42 Dose:  30 ml


   Documented by: 


Diphenhydramine HCl (Diphenhydramine 50 Mg/Ml Sdv)  25 mg IVPUSH Q6H PRN


   PRN Reason: pruritis


Diphenhydramine HCl (Diphenhydramine 50 Mg/Ml Sdv)  25 mg IVPUSH Q6H PRN


   PRN Reason: pruritis


Ephedrine Sulfate (Ephedrine 50 Mg/Ml Sdv)  5 mg IVPUSH ASDIRECTED PRN


   PRN Reason: Hypotension


Ephedrine Sulfate (Ephedrine 50 Mg/Ml Sdv)  5 mg IVPUSH ASDIRECTED PRN


   PRN Reason: Hypotension


Fentanyl (Fentanyl 100 Mcg/2 Ml Sdv)  100 mcg EPIDUR Q3H PRN


   PRN Reason: Pain


   Last Admin: 21 23:51 Dose:  100 mcg


   Documented by: 


Fentanyl (Fentanyl 100 Mcg/2 Ml Sdv) Confirm Administered Dose 100 mcg .ROUTE 

.STK-MED ONE


   Stop: 21 06:27


Fentanyl (Fentanyl 100 Mcg/2 Ml Sdv)  50 mcg IVPUSH Q20M PRN


   PRN Reason: Pain


Fentanyl/Bupivacaine HCl (Bupivacaine/Fentanyl/Ns 100 Ml Bag)  100 ml EPIDUR 

ASDIRECTED Counts include 234 beds at the [facility]


   Last Admin: 21 23:50 Dose:  100 ml


   Documented by: 


Hydromorphone HCl (Hydromorphone 0.5 Mg/0.5 Ml Syringe)  0.5 mg IVPUSH Q10M PRN


   PRN Reason: Pain (severe 7-10)


Oxytocin/Lactated Ringer's (Pitocin In Lr 10 Units/1,000 Ml)  10 unit in 1,000 

mls @ 12 mls/hr IV TITRATE COURTNEY; Protocol


   Last Titration: 21 06:12 Dose:  0 munits/min, 0 mls/hr


   Documented by: 


Oxytocin/Lactated Ringer's (Pitocin In Lr 10 Units/1,000 Ml)  10 unit in 1,000 

mls @ 500 mls/hr IV .CONTINUOUS COURTNEY


Lactated Ringer's (Ringers, Lactated)  1,000 mls @ 100 mls/hr IV ASDIRECTED Counts include 234 beds at the [facility]


   Last Admin: 21 02:53 Dose:  100 mls/hr


   Documented by: 


Oxytocin/Lactated Ringer's (Pitocin In Lr 20 Units/1,000 Ml)  20 unit in 1,000 

mls @ 66 mls/hr IV TITRATE COURTNEY; Protocol


   Last Admin: 21 10:12 Dose:  66 mls/hr


   Documented by: 


Sodium Chloride (Normal Saline)  1,000 mls @ 100 mls/hr .XX ASDIRECTED Counts include 234 beds at the [facility]


Sodium Chloride (Normal Saline) Confirm Administered Dose 1,000 mls @ as 

directed .ROUTE .STK-MED ONE


   Stop: 21 04:44


   Last Admin: 21 10:22 Dose:  Not Given


   Documented by: 


Cefazolin Sodium/Dextrose 2 gm (/ Premix)  50 mls @ 100 mls/hr IV ONETIME ONE


   Stop: 21 06:23


   Last Admin: 21 06:05 Dose:  100 mls/hr


   Documented by: 


Cefazolin Sodium/Dextrose (Ancef) Confirm Administered Dose 50 mls @ as directed

.ROUTE .STK-MED ONE


   Stop: 21 06:00


   Last Admin: 21 10:22 Dose:  Not Given


   Documented by: 


Azithromycin 500 mg/ Sodium (Chloride)  250 mls @ 250 mls/hr IV ONETIME STA


   Stop: 21 07:20


   Last Admin: 21 06:39 Dose:  250 mls/hr


   Documented by: 


Sodium Chloride (Normal Saline Advbag) Confirm Administered Dose 250 mls @ as 

directed .ROUTE .STK-MED ONE


   Stop: 21 06:26


   Last Admin: 21 10:23 Dose:  Not Given


   Documented by: 


Lactated Ringer's (Ringers, Lactated) Confirm Administered Dose 2,000 mls @ as 

directed .ROUTE .STK-MED ONE


   Stop: 21 06:27


Lactated Ringer's (Ringers, Lactated)  1,000 mls @ 125 mls/hr IV ASDIRECTED COURTNEY


Dextrose/Lactated Ringer's (Dextrose 5%-Lactated Ringers)  1,000 mls @ 125 

mls/hr IV ASDIRECTED COURTNEY


   Stop: 21 16:43


Ketorolac Tromethamine (Ketorolac 30 Mg/Ml Sdv) Confirm Administered Dose 30 mg 

.ROUTE .STK-MED ONE


   Stop: 21 06:27


Labetalol HCl (Labetalol 100 Mg/20 Ml Mdv) Confirm Administered Dose 100 mg 

.ROUTE .STK-MED ONE


   Stop: 21 07:24


Lidocaine/Epinephrine (Lidocaine 2% With Epinephrine 1:200,000 20 Ml Sdv) 

Confirm Administered Dose 20 ml .ROUTE .STK-MED ONE


   Stop: 21 06:27


Meperidine HCl (Meperidine 50 Mg/Ml Vial) Confirm Administered Dose 50 mg .ROUTE

.STK-MED ONE


   Stop: 21 07:17


Metoclopramide HCl (Metoclopramide 10 Mg/2 Ml Sdv)  10 mg IVPUSH ONETIME ONE


   Stop: 21 06:29


   Last Admin: 21 06:42 Dose:  10 mg


   Documented by: 


Miscellaneous Medication (Phenylephrine Hcl In 0.9% Nacl 1 Mg/10 Ml Syringe)  

0.1 mg IVPUSH Q10M PRN


   PRN Reason: Hypotension


Miscellaneous Medication (Phenylephrine Hcl In 0.9% Nacl 1 Mg/10 Ml Syringe) 

Confirm Administered Dose 1 mg .ROUTE .[employer]-MED ONE


   Stop: 21 06:27


Miscellaneous Medication (Phenylephrine Hcl In 0.9% Nacl 1 Mg/10 Ml Syringe)  

0.1 mg IVPUSH Q10M PRN


   PRN Reason: Hypotension


Misoprostol (Misoprostol 25 Mcg (1/4 Of 100 Mcg) Tab)  50 mcg VAG Q3HR PRN


   PRN Reason: cervical ripening


   Last Admin: 21 15:06 Dose:  50 mcg


   Documented by: 


Morphine Sulfate (Morphine Pf 10 Mg/10 Ml Sdv) Confirm Administered Dose 10 mg 

.ROUTE .STK-MED ONE


   Stop: 21 06:28


Nalbuphine HCl (Nalbuphine 10 Mg/1 Ml Vial)  10 mg IVPUSH Q2H PRN


   PRN Reason: Pain


Ondansetron HCl (Ondansetron 4 Mg/2 Ml Sdv)  4 mg IVPUSH Q4H PRN


   PRN Reason: Nausea/Vomiting


Ondansetron HCl (Ondansetron 4 Mg/2 Ml Sdv)  4 mg IVPUSH ONETIME PRN


   PRN Reason: Nausea/Vomiting


Ondansetron HCl (Ondansetron 4 Mg/2 Ml Sdv) Confirm Administered Dose 4 mg 

.ROUTE .STK-MED ONE


   Stop: 21 06:27


Ondansetron HCl (Ondansetron 4 Mg/2 Ml Sdv)  4 mg IVPUSH ONETIME PRN


   PRN Reason: Nausea/Vomiting


Oxytocin (Oxytocin 10 Units/1 Ml Sdv) Confirm Administered Dose 20 unit .ROUTE 

.STK-MED ONE


   Stop: 21 06:27


Sodium Chloride (Sodium Chloride 0.9% 10 Ml Syringe)  10 ml FLUSH ASDIRECTED PRN


   PRN Reason: Keep Vein Open


Sodium Chloride (Sodium Chloride 0.9% 10 Ml Syringe)  10 ml FLUSH ASDIRECTED PRN


   PRN Reason: Keep Vein Open

## 2021-12-03 NOTE — PCM.SN.2
- Free Text/Narrative


Note: 


Postoperative day #1-11/24/2021





Postpartum note:





Patient is doing well in the postop period. Minimal lochia, voiding well, 

ambulated without problems. Nursing without concerns.  Pain is under reasonably 

good control.





Patient is afebrile, vital signs are stable





Abdomen is flat, soft, uterus is below the umbilicus and is firm and nontender. 

Incision is intact, dry, no evidence of infection, hematoma or seroma.  

Colostomy bag in place.





Legs are nontender.





Assessment: Postop/postpartum recovery going well.





Plan: Routine postpartum care. Patient be discharged home   within the next 24-

48 hours.